# Patient Record
Sex: FEMALE | Race: WHITE | URBAN - METROPOLITAN AREA
[De-identification: names, ages, dates, MRNs, and addresses within clinical notes are randomized per-mention and may not be internally consistent; named-entity substitution may affect disease eponyms.]

---

## 2017-01-25 ENCOUNTER — PRE VISIT (OUTPATIENT)
Dept: OTOLARYNGOLOGY | Facility: CLINIC | Age: 82
End: 2017-01-25

## 2017-01-25 NOTE — TELEPHONE ENCOUNTER
1.  Date/reason for appt:  2/03/17   Vocal Cord Dysfunction    2.  Referring provider:  [facility]    3.  Call to patient (Yes / No - short description):  No, referred.      4.  Previous care at / records requested from:  [facility]

## 2017-02-09 ENCOUNTER — OFFICE VISIT (OUTPATIENT)
Dept: OTOLARYNGOLOGY | Facility: CLINIC | Age: 82
End: 2017-02-09

## 2017-02-09 DIAGNOSIS — R05.9 COUGH: Primary | ICD-10-CM

## 2017-02-09 DIAGNOSIS — J38.7 IRRITABLE LARYNX SYNDROME: ICD-10-CM

## 2017-02-09 DIAGNOSIS — J38.3 VOCAL CORD DYSFUNCTION: ICD-10-CM

## 2017-02-09 NOTE — LETTER
2/9/2017      RE: Dorina Vasquez  [address] [address]       [facility] VOICE [facility]  Brice Beck Jr., M.D., F.A.C.S.  Deana Stanley M.D., M.P.H.  Shiela Vicente, Ph.D., CCC/SLP  Elizabeth Martinez M.M. (voice), M.A., CCC/SLP  John Souza M.M. (voice), M.A., CCC/SLP    [facility] VOICE [facility]  VOICE/SPEECH/BREATHING EVALUATION AND LARYNGEAL EXAMINATION REPORT    Patient: Dorina Vasquez  Date of Visit: 2/9/2017    HISTORY  PATIENT INFORMATION  Dorina Vasquez was seen for initial evaluation today.  She is self-referred to this clinic, based on internet query.  She has been seen by ENT physicians Sam Carey and Michael Carranza for these problems.    DIAGNOSIS/REASON FOR REFERRAL  Cough and Vocal cord dysfunction/ Evaluate, perform laryngeal exam, treat as appropriate    HISTORY OF VOICE DISORDER  Ms. Vasquez is a 82 year old with a history of cough leading to vocal cord dysfunction.  Salient details of her history are as follows:    25 year Hx of cough leading to laryngospasms (aka vocal cord dysfunction); no precipitating event  o Many triggers  o Feels a tickle which leads to a cough  o When she feels the tickle she takes a sugar-free cough drop, and works hard to avoid coughing  o She can stop the cough with forceful exhalations, which can then circumvent the laryngospasm the majority of times  - The forceful exhalation was taught to her by Dr. Romeor, her PCP  o laryngospasms result in stridor less than 20% of the time in the last year; prior to that it was more often  o Episodes are fairly unpredictable; many possible triggers  o A few remote nocturnal episodes of cough without laryngospasm; these are essentially resolved    Increasing humidity in house has helped    No episodes for several weeks in January; then daily episodes after an exposure to smoke a week ago    Once triggered, the laryngospasm lasts a couple minutes, and then resolves over a few minutes, if she can avoid  coughing    She had an episode right after a surgery    Has seen two ENT physicians over past few years because of this problem  o The first recommended Zantac; she has been taking that b.i.d for almost a year; she thinks it may be helping (she had more relief when she doubled her dose from once to twice a day)  o Both noted normal vocal fold mobility, one by flexible endoscopy and one by mirror exam  o An incidental endoscopy during work-up for facial pain also showed normal mobility in 2013    No referral for therapy from ENT physicians; learned of the possibility of Vocal Cord Dysfunction while querying online; communicated with Dr. Bob Keenan, SLP voice specialist at [facility], who recommended this clinic    Lifelong history weak voice; has to strain to raise volume  o Unsure if voice use is related to cough or laryngospasm    Needs to undergo surgery for cataracts, but surgeon prefers in-patient procedure due to potential for laryngospasm    Patient Supplied Answers To [employer] Mail General Questionnaire  [employer] Intake - General Form Review 2/8/2017   Are you having any of these symptoms? Throat irritation, Throat tightness, Frequent throat-clearing, Frequent cough   Do you use caffeine? No   How many oz. of non-caffeinated fluid do you typically drink in a day? 32 oz.   How often do you experience heartburn, indigestion, chest pain, stomach acid coming up, and/or tasting acid in your mouth or throat? Monthly   Have reflux medications been recommended to you? Yes   If yes, are you taking them regularly? Yes   Voice: No   Swallowing: No   Cough and/or throat-clearing: Yes   Breathing problem: No   A different problem, not listed above: Yes   Other physicians/health care providers who should receive a copy of today's note (please provide first and last name(s), city): Dr. Jules Romero, my Internist, [facility], [phone]       Patient Supplied Answers To Shopping Mail Throat  Discomfort Questionnaire  No flowsheet data found.    Patient Supplied Answers To [facility] Intake Cough/Throat-Clearing Questionnaire  [facility] Intake - Cough/Throat-Clearing Review 2/8/2017   How long have you had this cough/throat-clearing problem? Cough and larynx spasm for 25+yrs.   Tickle in throat and cough can lead to larynx spasm. I quickly take sugar free cough drop, begin to quickly exhale w/ pursed lips in exaggerated form.  Hard to breathe & talk.  Stopping cough is crucial.   Was there anything unusual about the time this cough/throat-clearing problem was first noticed (such as illness, accident, surgery, etc)? If yes, please describe.  You have 200 characters to respond.  We will ask for more detail at your visit. To continue with answer in previous question, sometimes a cough drop is enough to stop cough, but if not, exaggerated exhaling is essential as well as stopping cough. In the 1990's spasms seemed to be less frequent.   What do you think caused this cough/throat-clearing problem? Spasms seemed reduced after new higher humidity device installed in our house.  Super Ball Sunday at my son's home, the fireplace warmed us all...but that night spasms returned. Noticed last few months, wine stings throat so I dilute w/ water.    How quickly did the cough/throat-clearing problem develop? Gradually   How do the cough/throat-clearing symptoms vary? Worse in the PM, Worse with stress, Worse with exertion, Variable, Unpredictable, On and off   Over time, how has the cough/throat-clearing problem changed? Same   Is there a tickle in your throat prior to coughing or throat clearing? Yes   What % of the time do you feel like you can control the urge to cough? 50%   Do any of the following trigger your cough? Cold air, Laughing, Drinking, Perfumes or strong smells, Trying to suppress a cough, Stress   If you have other triggers for your cough or throat-clearing issue, please list them here.  fireplace smoke, cold  milk mar at lunch causes esophageal discomfort,  dry air   What prior treatments have been tried for this cough/throat-clearing issue? Cough suppressants   What health care providers have you seen for this cough/throat-clearing problem? Who and when? Dr. Romero, my Internist., Dr. Sam Carey, ENT 2014, 2015 (he now is on medical leave) , Dr. Carranza ENT 2016   For your cough/throat-clearing problem, what testing/studies have you done (such as imaging, reflux testing, lung function studies, allergy testing)? Nasopharyngeal endoscopy; everything was normal.  Dr. Carey:  May be some acid reflux spraying on larynx so he prescribed Zantac.  I now take 2x day, 1/2 - 1 hour before lunch and 2 hours after dinner.   Did you receive any therapy or treatment for this cough/throat-clearing problem?  If so, please briefly describe. No.    For your cough/throat-clearing problem, is there anything else you'd like to tell us?  I am referred to Dr. Vicente by Dr. Bob Keenan, Ph.D. at [facility] after reading about his treatment for larynx spasms.  His writings show breathing exercises help reduce or eliminate larynx spasms.,        Patient Supplied Answers To [facility] Intake Breathing Questionnaire  No flowsheet data found.    Patient Supplied Answers To [facility] Intake Voice Questionnaire  No flowsheet data found.    Patient Supplied Answers To VHI Questionnaire  Voice Handicap Index (VHI-10) 2/8/2017   How often do you have any of the following symptoms:  Indigestion, heartburn, chest pain, stomach acid coming up, and/or tasting acid in your mouth or throat? Monthly   (F1) My voice makes it difficult for people to hear me. Sometimes   (F2) People have difficulty understanding me in a noisy room. Sometimes   (F8) My voice difficulties restrict my personal and social life. Never   (F9) I feel left out of conversations because of my voice. Never   (F10) My voice problem causes me to lose income. Never   (P5) I feel as though I  "have to strain to produce voice. Sometimes   (P6) The clarity of my voice is unpredictable. Never   (E4) My voice problem upsets me. Almost never   (E6) My voice makes me feel handicapped. Never   (P3) People ask, \"What's wrong with your voice?\" Never   VHI Total Score 7       Patient Supplied Answers To CSI Questionnaire  No flowsheet data found.    Patient Supplied Answers To EAT Questionnaire  No flowsheet data found.    CURRENT PATIENT COMPLAINTS    Primary: cough    Secondary: laryngospasm    soft voice quality; needs to push or strain voice to      Throat clearing and irritation    Denies significant dyspnea, dysphagia, or pain    OTHER PERTINENT HISTORY    Please see scanned dictations in EPIC    Unremarkable to this problem    OBJECTIVE FINDINGS  VOICE/ SPEECH/ NON-COMMUNICATIVE LARYNGEAL BEHAVIORS EVALUATION  An evaluation of the voice and breathing was accomplished today; salient features are as follows:    Cough/ Throat clear:    Not observed today    Breathing pattern:    Appears within normal limits and adequate during conversational speech at rest    No dyspnea or laryngospasms observed today    No overt tension is evident.    Voice quality is characterized by    Mild roughness and strain; WNL for her age    Frequent glottal maldonado    Habitual pitch was not formally tested, but is judged to be WNL and appropriate    Loudness is WNL and appropriate for the setting    LARYNGEAL EXAMINATION    Endoscopic laryngeal examination was not deemed warranted today, as she has had laryngeal exams demonstrating normal vocal fold mobility.  She preferred to learn techniques to avoid or arrest an episode of vocal fold dysfunction.  If she is not able to use these techniques adequately, we will do an examination while eliciting symptoms, so she can view compensatory strategies on the video monitor.  However, I did show her a number of laryngeal examination videos from our library of educational materials, so that she can " understand the nature of vocal cord dysfunction, and how it can be remediated functionally.  She found this to be very helpful.      THERAPEUTIC ACTIVITIES  Today Ms. Vasquez participated in the following therapeutic activities:    [place] concepts and technqiues for using saline and plain-water gargling and saline nasal irrigation, steam, and guaifenesin to reduce the laryngeal irritation.    [place] concepts and techniques for improving topical and systemic hydration to improve health of the laryngeal mucosa    [place] concepts and strategies managing laryngopharyngeal reflux disorder, to reduce laryngeal inflammation.    I provided instruction for techniques and strategies to reduce the chronic cough/throat clearing  o alternative behaviors such as voiceless glottic coup, humming, swallowing, etc. were taught  o strategies for reducing mucosal irritation were taught    [place] techniques for optimal breathing technique.    [place] techniques for rescue breathing in case of laryngospasm, aka vocal cord dysfunction  o [place] to use inhalation through a straw, or through rounded lips or the nasal passages, to increase the constriction at the upstream portion of inhalation, in order to reduce constriction downstream (at the glottis)  o [place] to use an abdominal muscle use pattern, in order to reduce the propensity for vocal cord dysfunction caused by clavicular inhalation patterns  o Good learning during practice of breathing techniques; understands how to practice on her own    [place] an optimal practice regimen for rehabilitation exercises.  o she should use an interval schedule of practice, with brief periods of practice frequently throughout each day  o [place] concepts of volitional practice to facilitate motor learning.    IMPRESSIONS/ RECOMMENDATIONS/ PLAN  IMPRESSIONS / RECOMMENDATIONS  Based on today's evaluation and initial therapy,  it appears that:    Cough/throat clear are accounted for by  hypersensitivity of the larynx and pharynx as evidenced by case history, patient complaints and apparent absence of other organic findings; this leads to a diagnosis of irritable larynx syndrome that underlies her other disorders    Vocal cord dysfunction (aka laryngospasm) is an apparent manifestation of the cough and irritable larynx, and should be remediated along with the more global symptoms; rescue breathing techniques were taught today    A course of speech therapy is recommended to:  o help reduce laryngeal irritation and thereby reduce cough and throat clear  o Teach breathing techniques to prevent and/or arrest an episode of vocal fold dysfunction  o Reduce overall irritable larynx syndrome    she is entirely amenable to this plan    We began therapy today, working on strategies to improve vocal health and technique    RATIONALE: Current level of functioning is:  CJ - At least 20 percent but less than 40 percent impaired, limited, or restricted   based on Ms. Vasquez's extent of vocal cord dysfunction, extent of impact on function, extent of discomfort, level of effort.    TREATMENT PLAN  Speech therapy    DURATION/FREQUENCY OF TREATMENT  Six weekly, one-hour sessions, with two monthly one-hour follow-up sessions    PROGNOSIS  good prognosis for improvement in cough, vocal cord dysfunction, and irritable larynx syndrome, with speech therapy and regular practice of therapeutic activities.    BARRIERS TO LEARNING/TEACHING AND LEARNING NEEDS  None/Unremarkable    GOALS  Patient goal:    To reduce her cough to acceptable levels  To breathe normally and comfortably in all situations    Long-term goal(s): In 3 months, Ms. Vasquez will:  1.  Report a week of typcal activities with irritable larynx syndrome symptoms that do not exceed a level of 3 out of 10, 80% of the time  2.  Report a week with no more than one episode of coughing per day, that does not last more than 2 seconds  3.  Report a week with no episodes  of vocal cord dysfuncton (aka laryngospasm)    G-Codes   - Voice functional limitation, current status, at evaluation  CJ - At least 20 percent but less than 40 percent impaired, limited, or restricted     - Voice functional limitation, projected goal status, at discharge from therapy  CI - At least 1 percent but less than 20 percent impaired, limited, or restricted    PRIMARY ICD-10 code:  R05 (Chronic Cough)  SECONDARY ICD-10 code:  J38.3 (Vocal cord dysfunction)   TERTIARY ICD-10 code:  J38.7 (Irritable Larynx Syndrome)    TOTAL SERVICE TIME: 120 minutes  EVALUATION OF VOICE AND RESONANCE: (40017): 75 minutes    TREATMENT (65402): 45 minutes  NO CHARGE FACILITY FEE (30192)      Shiela Vicente, Ph.D., [facility]SLP  Speech-Language Pathologist  Director, [facility]  [phone]              Shiela Vicente, SLP

## 2017-02-09 NOTE — LETTER
2/9/2017       RE: Dorina Vasquez  [address] [address]     Dear Colleague,    Thank you for referring your patient, Dorina Vasquez, to the [facility] at [facility]. Please see a copy of my visit note below.    [facility] VOICE [facility]  Brice Beck Jr., M.D., F.A.C.S.  Deana Stanley M.D., M.P.H.  Shiela Vicente, Ph.D., CCC/SLP  Elizabeth Martinez M.M. (voice), MANIA., CCC/SLP  John Souza M.M. (voice), MANIA., [facility]/SLP    [facility]  VOICE/SPEECH/BREATHING EVALUATION AND LARYNGEAL EXAMINATION REPORT    Patient: Dorina Vasquez  Date of Visit: 2/9/2017    HISTORY  PATIENT INFORMATION  Dorina Vasquez was seen for initial evaluation today.  She is self-referred to this clinic, based on internet query.  She has been seen by ENT physicians Sam Carey and Michael Carranza for these problems.    DIAGNOSIS/REASON FOR REFERRAL  Cough and Vocal cord dysfunction/ Evaluate, perform laryngeal exam, treat as appropriate    HISTORY OF VOICE DISORDER  Ms. Vasquez is a 82 year old with a history of cough leading to vocal cord dysfunction.  Salient details of her history are as follows:    25 year Hx of cough leading to laryngospasms (aka vocal cord dysfunction); no precipitating event  o Many triggers  o Feels a tickle which leads to a cough  o When she feels the tickle she takes a sugar-free cough drop, and works hard to avoid coughing  o She can stop the cough with forceful exhalations, which can then circumvent the laryngospasm the majority of times  - The forceful exhalation was taught to her by Dr. Romero, her PCP  o laryngospasms result in stridor less than 20% of the time in the last year; prior to that it was more often  o Episodes are fairly unpredictable; many possible triggers  o A few remote nocturnal episodes of cough without laryngospasm; these are essentially resolved    Increasing humidity in house has helped    No episodes for several weeks in  January; then daily episodes after an exposure to smoke a week ago    Once triggered, the laryngospasm lasts a couple minutes, and then resolves over a few minutes, if she can avoid coughing    She had an episode right after a surgery    Has seen two ENT physicians over past few years because of this problem  o The first recommended Zantac; she has been taking that b.i.d for almost a year; she thinks it may be helping (she had more relief when she doubled her dose from once to twice a day)  o Both noted normal vocal fold mobility, one by flexible endoscopy and one by mirror exam  o An incidental endoscopy during work-up for facial pain also showed normal mobility in 2013    No referral for therapy from ENT physicians; learned of the possibility of Vocal Cord Dysfunction while querying online; communicated with Dr. Bob Keenan, SLP voice specialist at [facility], who recommended this clinic    Lifelong history weak voice; has to strain to raise volume  o Unsure if voice use is related to cough or laryngospasm    Needs to undergo surgery for cataracts, but surgeon prefers in-patient procedure due to potential for laryngospasm    Patient Supplied Answers To [employer] Intake General Questionnaire  [employer] Intake - General Form Review 2/8/2017   Are you having any of these symptoms? Throat irritation, Throat tightness, Frequent throat-clearing, Frequent cough   Do you use caffeine? No   How many oz. of non-caffeinated fluid do you typically drink in a day? 32 oz.   How often do you experience heartburn, indigestion, chest pain, stomach acid coming up, and/or tasting acid in your mouth or throat? Monthly   Have reflux medications been recommended to you? Yes   If yes, are you taking them regularly? Yes   Voice: No   Swallowing: No   Cough and/or throat-clearing: Yes   Breathing problem: No   A different problem, not listed above: Yes   Other physicians/health care providers who should receive a copy of today's note  (please provide first and last name(s), city): Dr. Jules Romero, my Internist, [facility], [phone]       Patient Supplied Answers To [employer] Intake Throat Discomfort Questionnaire  No flowsheet data found.    Patient Supplied Answers To [employer] Intake Cough/Throat-Clearing Questionnaire  [employer]ions Intake - Cough/Throat-Clearing Review 2/8/2017   How long have you had this cough/throat-clearing problem? Cough and larynx spasm for 25+yrs.   Tickle in throat and cough can lead to larynx spasm. I quickly take sugar free cough drop, begin to quickly exhale w/ pursed lips in exaggerated form.  Hard to breathe & talk.  Stopping cough is crucial.   Was there anything unusual about the time this cough/throat-clearing problem was first noticed (such as illness, accident, surgery, etc)? If yes, please describe.  You have 200 characters to respond.  We will ask for more detail at your visit. To continue with answer in previous question, sometimes a cough drop is enough to stop cough, but if not, exaggerated exhaling is essential as well as stopping cough. In the 1990's spasms seemed to be less frequent.   What do you think caused this cough/throat-clearing problem? Spasms seemed reduced after new higher humidity device installed in our house.  Super Ball Sunday at my son's home, the fireplace warmed us all...but that night spasms returned. Noticed last few months, wine stings throat so I dilute w/ water.    How quickly did the cough/throat-clearing problem develop? Gradually   How do the cough/throat-clearing symptoms vary? Worse in the PM, Worse with stress, Worse with exertion, Variable, Unpredictable, On and off   Over time, how has the cough/throat-clearing problem changed? Same   Is there a tickle in your throat prior to coughing or throat clearing? Yes   What % of the time do you feel like you can control the urge to cough? 50%   Do any of the following trigger your cough? Cold air, Laughing, Drinking,  Perfumes or strong smells, Trying to suppress a cough, Stress   If you have other triggers for your cough or throat-clearing issue, please list them here.  fireplace smoke, cold milk mar at lunch causes esophageal discomfort,  dry air   What prior treatments have been tried for this cough/throat-clearing issue? Cough suppressants   What health care providers have you seen for this cough/throat-clearing problem? Who and when? Dr. Romero, my Internist., Dr. Sam Carey, ENT 2014, 2015 (he now is on medical leave) , Dr. Carranza ENT 2016   For your cough/throat-clearing problem, what testing/studies have you done (such as imaging, reflux testing, lung function studies, allergy testing)? Nasopharyngeal endoscopy; everything was normal.  Dr. Carey:  May be some acid reflux spraying on larynx so he prescribed Zantac.  I now take 2x day, 1/2 - 1 hour before lunch and 2 hours after dinner.   Did you receive any therapy or treatment for this cough/throat-clearing problem?  If so, please briefly describe. No.    For your cough/throat-clearing problem, is there anything else you'd like to tell us?  I am referred to Dr. Vicente by Dr. Bob Keenan, Ph.D. at [facility] after reading about his treatment for larynx spasms.  His writings show breathing exercises help reduce or eliminate larynx spasms.,        Patient Supplied Answers To [employer] Intake Breathing Questionnaire  No flowsheet data found.    Patient Supplied Answers To [employer] Intake Voice Questionnaire  No flowsheet data found.    Patient Supplied Answers To VHI Questionnaire  Voice Handicap Index (VHI-10) 2/8/2017   How often do you have any of the following symptoms:  Indigestion, heartburn, chest pain, stomach acid coming up, and/or tasting acid in your mouth or throat? Monthly   (F1) My voice makes it difficult for people to hear me. Sometimes   (F2) People have difficulty understanding me in a noisy room. Sometimes   (F8) My voice difficulties restrict my  "personal and social life. Never   (F9) I feel left out of conversations because of my voice. Never   (F10) My voice problem causes me to lose income. Never   (P5) I feel as though I have to strain to produce voice. Sometimes   (P6) The clarity of my voice is unpredictable. Never   (E4) My voice problem upsets me. Almost never   (E6) My voice makes me feel handicapped. Never   (P3) People ask, \"What's wrong with your voice?\" Never   VHI Total Score 7       Patient Supplied Answers To CSI Questionnaire  No flowsheet data found.    Patient Supplied Answers To EAT Questionnaire  No flowsheet data found.    CURRENT PATIENT COMPLAINTS    Primary: cough    Secondary: laryngospasm    soft voice quality; needs to push or strain voice to      Throat clearing and irritation    Denies significant dyspnea, dysphagia, or pain    OTHER PERTINENT HISTORY    Please see scanned dictations in EPIC    Unremarkable to this problem    OBJECTIVE FINDINGS  VOICE/ SPEECH/ NON-COMMUNICATIVE LARYNGEAL BEHAVIORS EVALUATION  An evaluation of the voice and breathing was accomplished today; salient features are as follows:    Cough/ Throat clear:    Not observed today    Breathing pattern:    Appears within normal limits and adequate during conversational speech at rest    No dyspnea or laryngospasms observed today    No overt tension is evident.    Voice quality is characterized by    Mild roughness and strain; WNL for her age    Frequent glottal maldonado    Habitual pitch was not formally tested, but is judged to be WNL and appropriate    Loudness is WNL and appropriate for the setting    LARYNGEAL EXAMINATION    Endoscopic laryngeal examination was not deemed warranted today, as she has had laryngeal exams demonstrating normal vocal fold mobility.  She preferred to learn techniques to avoid or arrest an episode of vocal fold dysfunction.  If she is not able to use these techniques adequately, we will do an examination while eliciting symptoms, so " she can view compensatory strategies on the video monitor.  However, I did show her a number of laryngeal examination videos from our library of educational materials, so that she can understand the nature of vocal cord dysfunction, and how it can be remediated functionally.  She found this to be very helpful.      THERAPEUTIC ACTIVITIES  Today Ms. Vasquez participated in the following therapeutic activities:    [place] concepts and technqiues for using saline and plain-water gargling and saline nasal irrigation, steam, and guaifenesin to reduce the laryngeal irritation.    [place] concepts and techniques for improving topical and systemic hydration to improve health of the laryngeal mucosa    [place] concepts and strategies managing laryngopharyngeal reflux disorder, to reduce laryngeal inflammation.    I provided instruction for techniques and strategies to reduce the chronic cough/throat clearing  o alternative behaviors such as voiceless glottic coup, humming, swallowing, etc. were taught  o strategies for reducing mucosal irritation were taught    [place] techniques for optimal breathing technique.    [place] techniques for rescue breathing in case of laryngospasm, aka vocal cord dysfunction  o [place] to use inhalation through a straw, or through rounded lips or the nasal passages, to increase the constriction at the upstream portion of inhalation, in order to reduce constriction downstream (at the glottis)  o [place] to use an abdominal muscle use pattern, in order to reduce the propensity for vocal cord dysfunction caused by clavicular inhalation patterns  o Good learning during practice of breathing techniques; understands how to practice on her own    [place] an optimal practice regimen for rehabilitation exercises.  o she should use an interval schedule of practice, with brief periods of practice frequently throughout each day  o [place] concepts of volitional practice to facilitate motor  learning.    IMPRESSIONS/ RECOMMENDATIONS/ PLAN  IMPRESSIONS / RECOMMENDATIONS  Based on today's evaluation and initial therapy,  it appears that:    Cough/throat clear are accounted for by hypersensitivity of the larynx and pharynx as evidenced by case history, patient complaints and apparent absence of other organic findings; this leads to a diagnosis of irritable larynx syndrome that underlies her other disorders    Vocal cord dysfunction (aka laryngospasm) is an apparent manifestation of the cough and irritable larynx, and should be remediated along with the more global symptoms; rescue breathing techniques were taught today    A course of speech therapy is recommended to:  o help reduce laryngeal irritation and thereby reduce cough and throat clear  o Teach breathing techniques to prevent and/or arrest an episode of vocal fold dysfunction  o Reduce overall irritable larynx syndrome    she is entirely amenable to this plan    We began therapy today, working on strategies to improve vocal health and technique    RATIONALE: Current level of functioning is:  CJ - At least 20 percent but less than 40 percent impaired, limited, or restricted   based on Ms. Vasquez's extent of vocal cord dysfunction, extent of impact on function, extent of discomfort, level of effort.    TREATMENT PLAN  Speech therapy    DURATION/FREQUENCY OF TREATMENT  Six weekly, one-hour sessions, with two monthly one-hour follow-up sessions    PROGNOSIS  good prognosis for improvement in cough, vocal cord dysfunction, and irritable larynx syndrome, with speech therapy and regular practice of therapeutic activities.    BARRIERS TO LEARNING/TEACHING AND LEARNING NEEDS  None/Unremarkable    GOALS  Patient goal:    To reduce her cough to acceptable levels  To breathe normally and comfortably in all situations    Long-term goal(s): In 3 months, Ms. Vasquez will:  1.  Report a week of typcal activities with irritable larynx syndrome symptoms that do not  exceed a level of 3 out of 10, 80% of the time  2.  Report a week with no more than one episode of coughing per day, that does not last more than 2 seconds  3.  Report a week with no episodes of vocal cord dysfuncton (aka laryngospasm)    G-Codes   - Voice functional limitation, current status, at evaluation  CJ - At least 20 percent but less than 40 percent impaired, limited, or restricted     - Voice functional limitation, projected goal status, at discharge from therapy  CI - At least 1 percent but less than 20 percent impaired, limited, or restricted    PRIMARY ICD-10 code:  R05 (Chronic Cough)  SECONDARY ICD-10 code:  J38.3 (Vocal cord dysfunction)   TERTIARY ICD-10 code:  J38.7 (Irritable Larynx Syndrome)    TOTAL SERVICE TIME: 120 minutes  EVALUATION OF VOICE AND RESONANCE: (11608): 75 minutes    TREATMENT (22610): 45 minutes  NO CHARGE FACILITY FEE (73212)      Shiela Vicente, Ph.D., [facility]-SLP  Speech-Language Pathologist  Director, [facility]  [phone]              Again, thank you for allowing me to participate in the care of your patient.      Sincerely,    Shiela Vicente, SLP

## 2017-02-09 NOTE — PROGRESS NOTES
[facility] VOICE [facility]  Brice Beck Jr., M.D., F.A.C.S.  Deana Stanley M.D., M.P.H.  Shiela Vicente, Ph.D., CCC/SLP  Elizabeth Martinez M.M. (voice), M.A., CCC/SLP  John Souza M.M. (voice), M.A., [facility]/SLP    [facility] VOICE [facility]  VOICE/SPEECH/BREATHING EVALUATION AND LARYNGEAL EXAMINATION REPORT    Patient: Dorina Vasquez  Date of Visit: 2/9/2017    HISTORY  PATIENT INFORMATION  Dorina Vasquez was seen for initial evaluation today.  She is self-referred to this clinic, based on internet query.  She has been seen by ENT physicians Sam Carey and Michael Carranza for these problems.    DIAGNOSIS/REASON FOR REFERRAL  Cough and Vocal cord dysfunction/ Evaluate, perform laryngeal exam, treat as appropriate    HISTORY OF VOICE DISORDER  Ms. Vasquez is a 82 year old with a history of cough leading to vocal cord dysfunction.  Salient details of her history are as follows:    25 year Hx of cough leading to laryngospasms (aka vocal cord dysfunction); no precipitating event  o Many triggers  o Feels a tickle which leads to a cough  o When she feels the tickle she takes a sugar-free cough drop, and works hard to avoid coughing  o She can stop the cough with forceful exhalations, which can then circumvent the laryngospasm the majority of times  - The forceful exhalation was taught to her by Dr. Romero, her PCP  o laryngospasms result in stridor less than 20% of the time in the last year; prior to that it was more often  o Episodes are fairly unpredictable; many possible triggers  o A few remote nocturnal episodes of cough without laryngospasm; these are essentially resolved    Increasing humidity in house has helped    No episodes for several weeks in January; then daily episodes after an exposure to smoke a week ago    Once triggered, the laryngospasm lasts a couple minutes, and then resolves over a few minutes, if she can avoid coughing    She had an episode right after a surgery    Has seen two ENT physicians over past few  years because of this problem  o The first recommended Zantac; she has been taking that b.i.d for almost a year; she thinks it may be helping (she had more relief when she doubled her dose from once to twice a day)  o Both noted normal vocal fold mobility, one by flexible endoscopy and one by mirror exam  o An incidental endoscopy during work-up for facial pain also showed normal mobility in 2013    No referral for therapy from ENT physicians; learned of the possibility of Vocal Cord Dysfunction while querying online; communicated with Dr. Bob Keenan, SLP voice specialist at [facility], who recommended this clinic    Lifelong history weak voice; has to strain to raise volume  o Unsure if voice use is related to cough or laryngospasm    Needs to undergo surgery for cataracts, but surgeon prefers in-patient procedure due to potential for laryngospasm    Patient Supplied Answers To [employer] Intake General Questionnaire  [facility]ions Intake - General Form Review 2/8/2017   Are you having any of these symptoms? Throat irritation, Throat tightness, Frequent throat-clearing, Frequent cough   Do you use caffeine? No   How many oz. of non-caffeinated fluid do you typically drink in a day? 32 oz.   How often do you experience heartburn, indigestion, chest pain, stomach acid coming up, and/or tasting acid in your mouth or throat? Monthly   Have reflux medications been recommended to you? Yes   If yes, are you taking them regularly? Yes   Voice: No   Swallowing: No   Cough and/or throat-clearing: Yes   Breathing problem: No   A different problem, not listed above: Yes   Other physicians/health care providers who should receive a copy of today's note (please provide first and last name(s), city): Dr. Jules Romero, my Internist, [facility], [phone]       Patient Supplied Answers To [employer]ie[employer] Intake Throat Discomfort Questionnaire  No flowsheet data found.    Patient Supplied Answers To Lions Intake  Cough/Throat-Clearing Questionnaire  Lieliane Intake - Cough/Throat-Clearing Review 2/8/2017   How long have you had this cough/throat-clearing problem? Cough and larynx spasm for 25+yrs.   Tickle in throat and cough can lead to larynx spasm. I quickly take sugar free cough drop, begin to quickly exhale w/ pursed lips in exaggerated form.  Hard to breathe & talk.  Stopping cough is crucial.   Was there anything unusual about the time this cough/throat-clearing problem was first noticed (such as illness, accident, surgery, etc)? If yes, please describe.  You have 200 characters to respond.  We will ask for more detail at your visit. To continue with answer in previous question, sometimes a cough drop is enough to stop cough, but if not, exaggerated exhaling is essential as well as stopping cough. In the 1990's spasms seemed to be less frequent.   What do you think caused this cough/throat-clearing problem? Spasms seemed reduced after new higher humidity device installed in our house.  Super Ball Sunday at my son's home, the fireplace warmed us all...but that night spasms returned. Noticed last few months, wine stings throat so I dilute w/ water.    How quickly did the cough/throat-clearing problem develop? Gradually   How do the cough/throat-clearing symptoms vary? Worse in the PM, Worse with stress, Worse with exertion, Variable, Unpredictable, On and off   Over time, how has the cough/throat-clearing problem changed? Same   Is there a tickle in your throat prior to coughing or throat clearing? Yes   What % of the time do you feel like you can control the urge to cough? 50%   Do any of the following trigger your cough? Cold air, Laughing, Drinking, Perfumes or strong smells, Trying to suppress a cough, Stress   If you have other triggers for your cough or throat-clearing issue, please list them here.  fireplace smoke, cold milk mar at lunch causes esophageal discomfort,  dry air   What prior treatments have been tried  for this cough/throat-clearing issue? Cough suppressants   What health care providers have you seen for this cough/throat-clearing problem? Who and when? Dr. Romero, my Internist., Dr. Sam Carey, ENT 2014, 2015 (he now is on medical leave) , Dr. Carranza ENT 2016   For your cough/throat-clearing problem, what testing/studies have you done (such as imaging, reflux testing, lung function studies, allergy testing)? Nasopharyngeal endoscopy; everything was normal.  Dr. Carey:  May be some acid reflux spraying on larynx so he prescribed Zantac.  I now take 2x day, 1/2 - 1 hour before lunch and 2 hours after dinner.   Did you receive any therapy or treatment for this cough/throat-clearing problem?  If so, please briefly describe. No.    For your cough/throat-clearing problem, is there anything else you'd like to tell us?  I am referred to Dr. Vicente by Dr. Bob Keenan, Ph.D. at [facility] after reading about his treatment for larynx spasms.  His writings show breathing exercises help reduce or eliminate larynx spasms.,        Patient Supplied Answers To Lions Intake Breathing Questionnaire  No flowsheet data found.    Patient Supplied Answers To Lions Intake Voice Questionnaire  No flowsheet data found.    Patient Supplied Answers To VHI Questionnaire  Voice Handicap Index (VHI-10) 2/8/2017   How often do you have any of the following symptoms:  Indigestion, heartburn, chest pain, stomach acid coming up, and/or tasting acid in your mouth or throat? Monthly   (F1) My voice makes it difficult for people to hear me. Sometimes   (F2) People have difficulty understanding me in a noisy room. Sometimes   (F8) My voice difficulties restrict my personal and social life. Never   (F9) I feel left out of conversations because of my voice. Never   (F10) My voice problem causes me to lose income. Never   (P5) I feel as though I have to strain to produce voice. Sometimes   (P6) The clarity of my voice is unpredictable. Never  "  (E4) My voice problem upsets me. Almost never   (E6) My voice makes me feel handicapped. Never   (P3) People ask, \"What's wrong with your voice?\" Never   VHI Total Score 7       Patient Supplied Answers To CSI Questionnaire  No flowsheet data found.    Patient Supplied Answers To EAT Questionnaire  No flowsheet data found.    CURRENT PATIENT COMPLAINTS    Primary: cough    Secondary: laryngospasm    soft voice quality; needs to push or strain voice to      Throat clearing and irritation    Denies significant dyspnea, dysphagia, or pain    OTHER PERTINENT HISTORY    Please see scanned dictations in EPIC    Unremarkable to this problem    OBJECTIVE FINDINGS  VOICE/ SPEECH/ NON-COMMUNICATIVE LARYNGEAL BEHAVIORS EVALUATION  An evaluation of the voice and breathing was accomplished today; salient features are as follows:    Cough/ Throat clear:    Not observed today    Breathing pattern:    Appears within normal limits and adequate during conversational speech at rest    No dyspnea or laryngospasms observed today    No overt tension is evident.    Voice quality is characterized by    Mild roughness and strain; WNL for her age    Frequent glottal maldonado    Habitual pitch was not formally tested, but is judged to be WNL and appropriate    Loudness is WNL and appropriate for the setting    LARYNGEAL EXAMINATION    Endoscopic laryngeal examination was not deemed warranted today, as she has had laryngeal exams demonstrating normal vocal fold mobility.  She preferred to learn techniques to avoid or arrest an episode of vocal fold dysfunction.  If she is not able to use these techniques adequately, we will do an examination while eliciting symptoms, so she can view compensatory strategies on the video monitor.  However, I did show her a number of laryngeal examination videos from our library of educational materials, so that she can understand the nature of vocal cord dysfunction, and how it can be remediated functionally.  She " found this to be very helpful.      THERAPEUTIC ACTIVITIES  Today Ms. Vasquez participated in the following therapeutic activities:    Grant concepts and technqiues for using saline and plain-water gargling and saline nasal irrigation, steam, and guaifenesin to reduce the laryngeal irritation.    Grant concepts and techniques for improving topical and systemic hydration to improve health of the laryngeal mucosa    Grant concepts and strategies managing laryngopharyngeal reflux disorder, to reduce laryngeal inflammation.    I provided instruction for techniques and strategies to reduce the chronic cough/throat clearing  o alternative behaviors such as voiceless glottic coup, humming, swallowing, etc. were taught  o strategies for reducing mucosal irritation were taught    Grant techniques for optimal breathing technique.    Grant techniques for rescue breathing in case of laryngospasm, aka vocal cord dysfunction  o Grant to use inhalation through a straw, or through rounded lips or the nasal passages, to increase the constriction at the upstream portion of inhalation, in order to reduce constriction downstream (at the glottis)  o Grant to use an abdominal muscle use pattern, in order to reduce the propensity for vocal cord dysfunction caused by clavicular inhalation patterns  o Good learning during practice of breathing techniques; understands how to practice on her own    Grant an optimal practice regimen for rehabilitation exercises.  o she should use an interval schedule of practice, with brief periods of practice frequently throughout each day  o Grant concepts of volitional practice to facilitate motor learning.    IMPRESSIONS/ RECOMMENDATIONS/ PLAN  IMPRESSIONS / RECOMMENDATIONS  Based on today's evaluation and initial therapy,  it appears that:    Cough/throat clear are accounted for by hypersensitivity of the larynx and pharynx as evidenced by case history, patient complaints and apparent  absence of other organic findings; this leads to a diagnosis of irritable larynx syndrome that underlies her other disorders    Vocal cord dysfunction (aka laryngospasm) is an apparent manifestation of the cough and irritable larynx, and should be remediated along with the more global symptoms; rescue breathing techniques were taught today    A course of speech therapy is recommended to:  o help reduce laryngeal irritation and thereby reduce cough and throat clear  o Teach breathing techniques to prevent and/or arrest an episode of vocal fold dysfunction  o Reduce overall irritable larynx syndrome    she is entirely amenable to this plan    We began therapy today, working on strategies to improve vocal health and technique    RATIONALE: Current level of functioning is:  CJ - At least 20 percent but less than 40 percent impaired, limited, or restricted   based on Ms. Vasquez's extent of vocal cord dysfunction, extent of impact on function, extent of discomfort, level of effort.    TREATMENT PLAN  Speech therapy    DURATION/FREQUENCY OF TREATMENT  Six weekly, one-hour sessions, with two monthly one-hour follow-up sessions    PROGNOSIS  good prognosis for improvement in cough, vocal cord dysfunction, and irritable larynx syndrome, with speech therapy and regular practice of therapeutic activities.    BARRIERS TO LEARNING/TEACHING AND LEARNING NEEDS  None/Unremarkable    GOALS  Patient goal:    To reduce her cough to acceptable levels  To breathe normally and comfortably in all situations    Long-term goal(s): In 3 months, Ms. Vasquez will:  1.  Report a week of typcal activities with irritable larynx syndrome symptoms that do not exceed a level of 3 out of 10, 80% of the time  2.  Report a week with no more than one episode of coughing per day, that does not last more than 2 seconds  3.  Report a week with no episodes of vocal cord dysfuncton (aka laryngospasm)    G-Codes   - Voice functional limitation, current  status, at evaluation  CJ - At least 20 percent but less than 40 percent impaired, limited, or restricted     - Voice functional limitation, projected goal status, at discharge from therapy  CI - At least 1 percent but less than 20 percent impaired, limited, or restricted    PRIMARY ICD-10 code:  R05 (Chronic Cough)  SECONDARY ICD-10 code:  J38.3 (Vocal cord dysfunction)   TERTIARY ICD-10 code:  J38.7 (Irritable Larynx Syndrome)    TOTAL SERVICE TIME: 120 minutes  EVALUATION OF VOICE AND RESONANCE: (14515): 75 minutes    TREATMENT (64631): 45 minutes  NO CHARGE FACILITY FEE (67113)      Shiela Vicente, Ph.D., [facility]-SLP  Speech-Language Pathologist  Director, [facility]  [phone]

## 2017-02-09 NOTE — MR AVS SNAPSHOT
After Visit Summary   2/9/2017    Dorina Vasquez    MRN: [id number]           Patient Information     Date Of Birth          4/24/1934        Visit Information        Provider Department      2/9/2017 1:30 PM Shiela Vicente SLP [facility]        Today's Diagnoses     Cough    -  1    Vocal cord dysfunction        Irritable larynx syndrome           Follow-ups after your visit        Your next 10 appointments already scheduled     Mar 02, 2017  1:30 PM CST   (Arrive by 1:15 PM)   RETURN SLP VOICE with [facility] ([facility])    [address] [place] [id number] [phone]            Apr 04, 2017  2:30 PM CDT   (Arrive by 2:15 PM)   RETURN SLP VOICE with [facility] ([facility])    [address] [place] [id number] [phone]            Apr 25, 2017  2:30 PM CDT   (Arrive by 2:15 PM)   RETURN SLP VOICE with [facility] ([facility])    [address] [place] [id number] [phone]              Who to contact     Please call your clinic at [phone] to:    Ask questions about your health    Make or cancel appointments    Discuss your medicines    Learn about your test results    Speak to your doctor   If you have compliments or concerns about an experience at your clinic, or if you wish to file a complaint, please contact [facility] Patient Relations at [phone] or email us at [email or web address]         Additional Information About Your Visit        MyChart Information     [facility] gives you secure access to your electronic health record. If you see a primary care provider, you can also send messages to your care team and make appointments. If you have questions, please call your primary care clinic.  If you  do not have a primary care provider, please call [phone] and they will assist you.      [employer] is an electronic gateway that provides easy, online access to your medical records. With [employer], you can request a clinic appointment, read your test results, renew a prescription or communicate with your care team.     To access your existing account, please contact your [facility] or call [phone] for assistance.        Care EveryWhere ID     This is your Care EveryWhere ID. This could be used by other organizations to access your [place] medical records  [id number]         Blood Pressure from Last 3 Encounters:   No data found for BP    Weight from Last 3 Encounters:   No data found for Wt              We Performed the Following     C BEHAVIORAL & QUALITATIVE ANALYSIS VOICE AND RESONANCE     SPEECH/HEARING THERAPY, INDIVIDUAL        Primary Care Provider Office Phone # Fax #    Jules Romero [phone]       [facility] [address] [address]        Thank you!     Thank you for choosing  [facility]  for your care. Our goal is always to provide you with excellent care. Hearing back from our patients is one way we can continue to improve our services. Please take a few minutes to complete the written survey that you may receive in the mail after your visit with us. Thank you!             Your Updated Medication List - Protect others around you: Learn how to safely use, store and throw away your medicines at www.disposemymeds.org.      Notice  As of 2/9/2017 11:59 PM    You have not been prescribed any medications.

## 2017-02-09 NOTE — LETTER
2/9/2017       RE: Dorina Vasquez  [address] [facility] [address]     Dear Colleague,    Thank you for referring your patient, Dorina Vasquez, to the [facility] at [facility]. Please see a copy of my visit note below.    [facility] VOICE [facility]  Brice Beck Jr., M.D., F.A.C.S.  Deana Stanley M.D., M.P.H.  Shiela Vicente, Ph.D., CCC/SLP  Elizabeth Martinez M.M. (voice), MANIA., CCC/SLP  John Souza M.M. (voice), MANIA., [facility]/SLP    [facility]  VOICE/SPEECH/BREATHING EVALUATION AND LARYNGEAL EXAMINATION REPORT    Patient: Dorina Vasquez  Date of Visit: 2/9/2017    HISTORY  PATIENT INFORMATION  Dorina Vasquez was seen for initial evaluation today.  She is self-referred to this clinic, based on internet query.  She has been seen by ENT physicians Sam Carey and Michael Carranza for these problems.    DIAGNOSIS/REASON FOR REFERRAL  Cough and Vocal cord dysfunction/ Evaluate, perform laryngeal exam, treat as appropriate    HISTORY OF VOICE DISORDER  Ms. Vasquez is a 82 year old with a history of cough leading to vocal cord dysfunction.  Salient details of her history are as follows:    25 year Hx of cough leading to laryngospasms (aka vocal cord dysfunction); no precipitating event  o Many triggers  o Feels a tickle which leads to a cough  o When she feels the tickle she takes a sugar-free cough drop, and works hard to avoid coughing  o She can stop the cough with forceful exhalations, which can then circumvent the laryngospasm the majority of times  - The forceful exhalation was taught to her by Dr. Romero, her PCP  o laryngospasms result in stridor less than 20% of the time in the last year; prior to that it was more often  o Episodes are fairly unpredictable; many possible triggers  o A few remote nocturnal episodes of cough without laryngospasm; these are essentially resolved    Increasing humidity in house has helped    No episodes for several weeks in  January; then daily episodes after an exposure to smoke a week ago    Once triggered, the laryngospasm lasts a couple minutes, and then resolves over a few minutes, if she can avoid coughing    She had an episode right after a surgery    Has seen two ENT physicians over past few years because of this problem  o The first recommended Zantac; she has been taking that b.i.d for almost a year; she thinks it may be helping (she had more relief when she doubled her dose from once to twice a day)  o Both noted normal vocal fold mobility, one by flexible endoscopy and one by mirror exam  o An incidental endoscopy during work-up for facial pain also showed normal mobility in 2013    No referral for therapy from ENT physicians; learned of the possibility of Vocal Cord Dysfunction while querying online; communicated with Dr. Bob Keenan, SLP voice specialist at [facility], who recommended this clinic    Lifelong history weak voice; has to strain to raise volume  o Unsure if voice use is related to cough or laryngospasm    Needs to undergo surgery for cataracts, but surgeon prefers in-patient procedure due to potential for laryngospasm    Patient Supplied Answers To [employer] Intake General Questionnaire  [facility] Intake - General Form Review 2/8/2017   Are you having any of these symptoms? Throat irritation, Throat tightness, Frequent throat-clearing, Frequent cough   Do you use caffeine? No   How many oz. of non-caffeinated fluid do you typically drink in a day? 32 oz.   How often do you experience heartburn, indigestion, chest pain, stomach acid coming up, and/or tasting acid in your mouth or throat? Monthly   Have reflux medications been recommended to you? Yes   If yes, are you taking them regularly? Yes   Voice: No   Swallowing: No   Cough and/or throat-clearing: Yes   Breathing problem: No   A different problem, not listed above: Yes   Other physicians/health care providers who should receive a copy of today's note  (please provide first and last name(s), city): Dr. Jules Romero, my Internist, [facility], [phone]       Patient Supplied Answers To [facility] Intake Throat Discomfort Questionnaire  No flowsheet data found.    Patient Supplied Answers To [facility] Intake Cough/Throat-Clearing Questionnaire  [facility] Intake - Cough/Throat-Clearing Review 2/8/2017   How long have you had this cough/throat-clearing problem? Cough and larynx spasm for 25+yrs.   Tickle in throat and cough can lead to larynx spasm. I quickly take sugar free cough drop, begin to quickly exhale w/ pursed lips in exaggerated form.  Hard to breathe & talk.  Stopping cough is crucial.   Was there anything unusual about the time this cough/throat-clearing problem was first noticed (such as illness, accident, surgery, etc)? If yes, please describe.  You have 200 characters to respond.  We will ask for more detail at your visit. To continue with answer in previous question, sometimes a cough drop is enough to stop cough, but if not, exaggerated exhaling is essential as well as stopping cough. In the 1990's spasms seemed to be less frequent.   What do you think caused this cough/throat-clearing problem? Spasms seemed reduced after new higher humidity device installed in our house.  Super Ball Sunday at my son's home, the fireplace warmed us all...but that night spasms returned. Noticed last few months, wine stings throat so I dilute w/ water.    How quickly did the cough/throat-clearing problem develop? Gradually   How do the cough/throat-clearing symptoms vary? Worse in the PM, Worse with stress, Worse with exertion, Variable, Unpredictable, On and off   Over time, how has the cough/throat-clearing problem changed? Same   Is there a tickle in your throat prior to coughing or throat clearing? Yes   What % of the time do you feel like you can control the urge to cough? 50%   Do any of the following trigger your cough? Cold air, Laughing, Drinking,  Perfumes or strong smells, Trying to suppress a cough, Stress   If you have other triggers for your cough or throat-clearing issue, please list them here.  fireplace smoke, cold milk mar at lunch causes esophageal discomfort,  dry air   What prior treatments have been tried for this cough/throat-clearing issue? Cough suppressants   What health care providers have you seen for this cough/throat-clearing problem? Who and when? Dr. Romero, my Internist., Dr. Sam Carey, ENT 2014, 2015 (he now is on medical leave) , Dr. Carranza ENT 2016   For your cough/throat-clearing problem, what testing/studies have you done (such as imaging, reflux testing, lung function studies, allergy testing)? Nasopharyngeal endoscopy; everything was normal.  Dr. Carey:  May be some acid reflux spraying on larynx so he prescribed Zantac.  I now take 2x day, 1/2 - 1 hour before lunch and 2 hours after dinner.   Did you receive any therapy or treatment for this cough/throat-clearing problem?  If so, please briefly describe. No.    For your cough/throat-clearing problem, is there anything else you'd like to tell us?  I am referred to Dr. Vicente by Dr. Bob Keenan, Ph.D. at [facility] after reading about his treatment for larynx spasms.  His writings show breathing exercises help reduce or eliminate larynx spasms.,        Patient Supplied Answers To [facility] Intake Breathing Questionnaire  No flowsheet data found.    Patient Supplied Answers To [facility] Intake Voice Questionnaire  No flowsheet data found.    Patient Supplied Answers To VHI Questionnaire  Voice Handicap Index (VHI-10) 2/8/2017   How often do you have any of the following symptoms:  Indigestion, heartburn, chest pain, stomach acid coming up, and/or tasting acid in your mouth or throat? Monthly   (F1) My voice makes it difficult for people to hear me. Sometimes   (F2) People have difficulty understanding me in a noisy room. Sometimes   (F8) My voice difficulties restrict my  "personal and social life. Never   (F9) I feel left out of conversations because of my voice. Never   (F10) My voice problem causes me to lose income. Never   (P5) I feel as though I have to strain to produce voice. Sometimes   (P6) The clarity of my voice is unpredictable. Never   (E4) My voice problem upsets me. Almost never   (E6) My voice makes me feel handicapped. Never   (P3) People ask, \"What's wrong with your voice?\" Never   VHI Total Score 7       Patient Supplied Answers To CSI Questionnaire  No flowsheet data found.    Patient Supplied Answers To EAT Questionnaire  No flowsheet data found.    CURRENT PATIENT COMPLAINTS    Primary: cough    Secondary: laryngospasm    soft voice quality; needs to push or strain voice to      Throat clearing and irritation    Denies significant dyspnea, dysphagia, or pain    OTHER PERTINENT HISTORY    Please see scanned dictations in EPIC    Unremarkable to this problem    OBJECTIVE FINDINGS  VOICE/ SPEECH/ NON-COMMUNICATIVE LARYNGEAL BEHAVIORS EVALUATION  An evaluation of the voice and breathing was accomplished today; salient features are as follows:    Cough/ Throat clear:    Not observed today    Breathing pattern:    Appears within normal limits and adequate during conversational speech at rest    No dyspnea or laryngospasms observed today    No overt tension is evident.    Voice quality is characterized by    Mild roughness and strain; WNL for her age    Frequent glottal maldonado    Habitual pitch was not formally tested, but is judged to be WNL and appropriate    Loudness is WNL and appropriate for the setting    LARYNGEAL EXAMINATION    Endoscopic laryngeal examination was not deemed warranted today, as she has had laryngeal exams demonstrating normal vocal fold mobility.  She preferred to learn techniques to avoid or arrest an episode of vocal fold dysfunction.  If she is not able to use these techniques adequately, we will do an examination while eliciting symptoms, so " she can view compensatory strategies on the video monitor.  However, I did show her a number of laryngeal examination videos from our library of educational materials, so that she can understand the nature of vocal cord dysfunction, and how it can be remediated functionally.  She found this to be very helpful.      THERAPEUTIC ACTIVITIES  Today Ms. Vasquez participated in the following therapeutic activities:    [place] concepts and technqiues for using saline and plain-water gargling and saline nasal irrigation, steam, and guaifenesin to reduce the laryngeal irritation.    [place] concepts and techniques for improving topical and systemic hydration to improve health of the laryngeal mucosa    [place] concepts and strategies managing laryngopharyngeal reflux disorder, to reduce laryngeal inflammation.    I provided instruction for techniques and strategies to reduce the chronic cough/throat clearing  o alternative behaviors such as voiceless glottic coup, humming, swallowing, etc. were taught  o strategies for reducing mucosal irritation were taught    [place] techniques for optimal breathing technique.    [place] techniques for rescue breathing in case of laryngospasm, aka vocal cord dysfunction  o [place] to use inhalation through a straw, or through rounded lips or the nasal passages, to increase the constriction at the upstream portion of inhalation, in order to reduce constriction downstream (at the glottis)  o [place] to use an abdominal muscle use pattern, in order to reduce the propensity for vocal cord dysfunction caused by clavicular inhalation patterns  o Good learning during practice of breathing techniques; understands how to practice on her own    [place] an optimal practice regimen for rehabilitation exercises.  o she should use an interval schedule of practice, with brief periods of practice frequently throughout each day  o [place] concepts of volitional practice to facilitate motor  learning.    IMPRESSIONS/ RECOMMENDATIONS/ PLAN  IMPRESSIONS / RECOMMENDATIONS  Based on today's evaluation and initial therapy,  it appears that:    Cough/throat clear are accounted for by hypersensitivity of the larynx and pharynx as evidenced by case history, patient complaints and apparent absence of other organic findings; this leads to a diagnosis of irritable larynx syndrome that underlies her other disorders    Vocal cord dysfunction (aka laryngospasm) is an apparent manifestation of the cough and irritable larynx, and should be remediated along with the more global symptoms; rescue breathing techniques were taught today    A course of speech therapy is recommended to:  o help reduce laryngeal irritation and thereby reduce cough and throat clear  o Teach breathing techniques to prevent and/or arrest an episode of vocal fold dysfunction  o Reduce overall irritable larynx syndrome    she is entirely amenable to this plan    We began therapy today, working on strategies to improve vocal health and technique    RATIONALE: Current level of functioning is:  CJ - At least 20 percent but less than 40 percent impaired, limited, or restricted   based on Ms. Vasquez's extent of vocal cord dysfunction, extent of impact on function, extent of discomfort, level of effort.    TREATMENT PLAN  Speech therapy    DURATION/FREQUENCY OF TREATMENT  Six weekly, one-hour sessions, with two monthly one-hour follow-up sessions    PROGNOSIS  good prognosis for improvement in cough, vocal cord dysfunction, and irritable larynx syndrome, with speech therapy and regular practice of therapeutic activities.    BARRIERS TO LEARNING/TEACHING AND LEARNING NEEDS  None/Unremarkable    GOALS  Patient goal:    To reduce her cough to acceptable levels  To breathe normally and comfortably in all situations    Long-term goal(s): In 3 months, Ms. Vasquez will:  1.  Report a week of typcal activities with irritable larynx syndrome symptoms that do not  exceed a level of 3 out of 10, 80% of the time  2.  Report a week with no more than one episode of coughing per day, that does not last more than 2 seconds  3.  Report a week with no episodes of vocal cord dysfuncton (aka laryngospasm)    G-Codes   - Voice functional limitation, current status, at evaluation  CJ - At least 20 percent but less than 40 percent impaired, limited, or restricted     - Voice functional limitation, projected goal status, at discharge from therapy  CI - At least 1 percent but less than 20 percent impaired, limited, or restricted    PRIMARY ICD-10 code:  R05 (Chronic Cough)  SECONDARY ICD-10 code:  J38.3 (Vocal cord dysfunction)   TERTIARY ICD-10 code:  J38.7 (Irritable Larynx Syndrome)    TOTAL SERVICE TIME: 120 minutes  EVALUATION OF VOICE AND RESONANCE: (50942): 75 minutes    TREATMENT (93670): 45 minutes  NO CHARGE FACILITY FEE (84192)      Shiela Vicente, Ph.D., [facility]-SLP  Speech-Language Pathologist  Director, [facility]  [phone]              Again, thank you for allowing me to participate in the care of your patient.      Sincerely,    Shiela Vicente, SLP

## 2017-02-20 PROBLEM — J38.7 IRRITABLE LARYNX SYNDROME: Status: ACTIVE | Noted: 2017-02-20

## 2017-02-20 PROBLEM — R05.9 COUGH: Status: ACTIVE | Noted: 2017-02-20

## 2017-02-20 PROBLEM — J38.3 VOCAL CORD DYSFUNCTION: Status: ACTIVE | Noted: 2017-02-20

## 2017-03-02 ENCOUNTER — OFFICE VISIT (OUTPATIENT)
Dept: OTOLARYNGOLOGY | Facility: CLINIC | Age: 82
End: 2017-03-02

## 2017-03-02 DIAGNOSIS — R05.9 COUGH: Primary | ICD-10-CM

## 2017-03-02 DIAGNOSIS — J38.3 VOCAL CORD DYSFUNCTION: ICD-10-CM

## 2017-03-02 DIAGNOSIS — J38.7 IRRITABLE LARYNX SYNDROME: ICD-10-CM

## 2017-03-02 NOTE — MR AVS SNAPSHOT
After Visit Summary   3/2/2017    Dorina Vasquez    MRN: [id number]           Patient Information     Date Of Birth          4/24/1934        Visit Information        Provider Department      3/2/2017 1:30 PM Shiela Vicente SLP [facility]        Today's Diagnoses     Cough    -  1    Vocal cord dysfunction        Irritable larynx syndrome           Follow-ups after your visit        Your next 10 appointments already scheduled     Mar 14, 2017  1:30 PM CDT   (Arrive by 1:15 PM)   RETURN SLP VOICE with [facility]    [address] [place] [id number] [phone]            Apr 04, 2017  2:30 PM CDT   (Arrive by 2:15 PM)   RETURN SLP VOICE with [facility]    [address] [place] [id number] [phone]            Apr 25, 2017  2:30 PM CDT   (Arrive by 2:15 PM)   RETURN SLP VOICE with [facility]    [address] [place] [address] [phone]              Who to contact     Please call your clinic at [phone] to:    Ask questions about your health    Make or cancel appointments    Discuss your medicines    Learn about your test results    Speak to your doctor   If you have compliments or concerns about an experience at your clinic, or if you wish to file a complaint, please contact [facility] Patient Relations at [phone] or email us at [email or web address]         Additional Information About Your Visit        MyChart Information     [facility] gives you secure access to your electronic health record. If you see a primary care provider, you can also send messages to your care team and make appointments. If you have questions, please call your primary care clinic.  If you  do not have a primary care provider, please call [phone] and they will assist you.      [facility] is an electronic gateway that provides easy, online access to your medical records. With [facility], you can request a clinic appointment, read your test results, renew a prescription or communicate with your care team.     To access your existing account, please contact your [facility] or call [phone] for assistance.        Care EveryWhere ID     This is your Care EveryWhere ID. This could be used by other organizations to access your [facility] medical records  [id number]         Blood Pressure from Last 3 Encounters:   No data found for BP    Weight from Last 3 Encounters:   No data found for Wt              We Performed the Following     SPEECH/HEARING THERAPY, INDIVIDUAL        Primary Care Provider Office Phone # Fax #    Jules Romero [phone]       [facility] [address] [address]        Thank you!     Thank you for choosing [facility] [employer] [facility]  for your care. Our goal is always to provide you with excellent care. Hearing back from our patients is one way we can continue to improve our services. Please take a few minutes to complete the written survey that you may receive in the mail after your visit with us. Thank you!             Your Updated Medication List - Protect others around you: Learn how to safely use, store and throw away your medicines at www.disposemymeds.org.      Notice  As of 3/2/2017 11:59 PM    You have not been prescribed any medications.

## 2017-03-02 NOTE — LETTER
3/2/2017      RE: Dorina Vasquez  [address] [facility] [address]       [facility] VOICE [facility]  Brice Beck Jr., M.D., F.A.C.S.  Deana Stanley M.D., M.P.H.  Shiela Vicente, Ph.D., CCC/SLP  Elizabeth Martinez M.M. (voice), M.A., CCC/SLP  John Souza M.M. (voice), M.A., CCC/SLP    [facility]  VOICE/SPEECH/BREATHING THERAPY PROGRESS REPORT    Patient: Dorina Vasquez  Date of Service: 3/2/2017    PROGRESS SINCE LAST SESSION  I had the pleasure of seeing Ms. Vasquez for the first-evaluation/ therapy session on 2/09.  she was referred by Dr. Bob Keenan of the [facility], for medically necessary speech therapy to address a diagnosis of R05 (Cough) and J38.3 (Laryngospasm) and J38.7 (Irritable Larynx Syndrome).  This is her 2nd session including evaluation.      Ms. Vasquez states that:    She is trying all the recommendations for alternatives to cough/throat-clear; sipping and swallowing hard are both helpful    She has had 2 or 3 episodes of laryngospasm since 2/09, though none were of the the same magnitude as previous episodes  o One episode was not preceded by cough; this is new; she used her strategies and was able to regain control of her breathing quickly (less than a minute)    Ms. Vasquez presents today with the following:  Voice quality:    Pressed, with near-constant glottal maldonado, and poor airflow  Cough/ Throat clear:    Not observed today    THERAPEUTIC ACTIVITIES  Today Ms. Vasquez participated in the following therapeutic activities:    [place] concepts of the purposes for many of the strategies she is using; we went through the strategies one by one.  o This was in response to her questions    [place] exercises for improved airflow during phonation.  o Straw phonation was facilitating, in basic preliminary exercises  o She learned concepts of why working to reduce dysphonia will be helpful, and agreed to this as a treatment plan    IMPRESSIONS/GOALS/PLAN  Ms. Vasquez had a  productive session of therapy today, working on techniques/strategies/exercises that will help her achieve her goal of reducing cough, vocal cord dysfunction, and irritable larynx syndrome; allowing her to meet personal and professional vocal demands and fully engage in activities of daily living.   She will work on her exercises on a daily basis, and work on incorporating the techniques into her daily vocal activities.    Goals for this practice period:     practice all exercises according to instructions    Plan: I will see Ms. Vasquez in two weeks to work on education, modification, and carryover of therapeutic activities to more complex phonatory tasks.    G-Codes were reported on 2/09    PRIMARY ICD-10 code: R05 (Cough)  SECONDARY ICD-10 code:  J38.3 (Vocal cord dysfunction)  TERTIARY ICD-10 code: J38.7 (Irritable Larynx Syndrome)     TOTAL SERVICE TIME: 75 minutes  TREATMENT (78033): 75 minutes  NO CHARGE FACILITY FEE (08733)    Shiela Vicente, Ph.D., [facility]-SLP  Speech-Language Pathologist  Director, [facility]  [phone]

## 2017-03-02 NOTE — PROGRESS NOTES
[facility] VOICE [facility]  Brice Beck Jr., M.D., F.A.C.S.  Deana Stanley M.D., M.P.H.  Shiela Vicente, Ph.D., CCC/SLP  Elizabeth Martinez M.M. (voice), M.A., CCC/SLP  John Souza M.M. (voice), M.A., [facility]/SLP    [facility] VOICE [facility]  VOICE/SPEECH/BREATHING THERAPY PROGRESS REPORT    Patient: Dorina Vasquez  Date of Service: 3/2/2017    PROGRESS SINCE LAST SESSION  I had the pleasure of seeing Ms. Vasquez for the first-evaluation/ therapy session on 2/09.  she was referred by Dr. Bob Keenan of the [facility], for medically necessary speech therapy to address a diagnosis of R05 (Cough) and J38.3 (Laryngospasm) and J38.7 (Irritable Larynx Syndrome).  This is her 2nd session including evaluation.      Ms. Vasquez states that:    She is trying all the recommendations for alternatives to cough/throat-clear; sipping and swallowing hard are both helpful    She has had 2 or 3 episodes of laryngospasm since 2/09, though none were of the the same magnitude as previous episodes  o One episode was not preceded by cough; this is new; she used her strategies and was able to regain control of her breathing quickly (less than a minute)    Ms. Vasquez presents today with the following:  Voice quality:    Pressed, with near-constant glottal maldonado, and poor airflow  Cough/ Throat clear:    Not observed today    THERAPEUTIC ACTIVITIES  Today Ms. Vasquez participated in the following therapeutic activities:    Mayfield concepts of the purposes for many of the strategies she is using; we went through the strategies one by one.  o This was in response to her questions    Mayfield exercises for improved airflow during phonation.  o Straw phonation was facilitating, in basic preliminary exercises  o She learned concepts of why working to reduce dysphonia will be helpful, and agreed to this as a treatment plan    IMPRESSIONS/GOALS/PLAN  Ms. Vasquez had a productive session of therapy today, working on techniques/strategies/exercises that will  help her achieve her goal of reducing cough, vocal cord dysfunction, and irritable larynx syndrome; allowing her to meet personal and professional vocal demands and fully engage in activities of daily living.   She will work on her exercises on a daily basis, and work on incorporating the techniques into her daily vocal activities.    Goals for this practice period:     practice all exercises according to instructions    Plan: I will see Ms. Vasquez in two weeks to work on education, modification, and carryover of therapeutic activities to more complex phonatory tasks.    G-Codes were reported on 2/09    PRIMARY ICD-10 code: R05 (Cough)  SECONDARY ICD-10 code:  J38.3 (Vocal cord dysfunction)  TERTIARY ICD-10 code: J38.7 (Irritable Larynx Syndrome)     TOTAL SERVICE TIME: 75 minutes  TREATMENT (35276): 75 minutes  NO CHARGE FACILITY FEE (31129)    Shiela Vicente, Ph.D., [facility]-SLP  Speech-Language Pathologist  Director, [facility]  [phone]

## 2017-03-14 NOTE — PROGRESS NOTES
Outpatient Speech Language Therapy Evaluation  PLAN OF TREATMENT FOR OUTPATIENT REHABILITATION  (COMPLETE FOR INITIAL CLAIMS ONLY)  Patient's Last Name, First Name, M.I.  YOB: 1934  Dorina Vasquez                        Provider's Name  Shiela Vicente Medical Record No.  [id number]                               Onset Date:  2/09/17   Start of Care Date: 2/09/17     Type: Speech Language Therapy Medical Diagnosis: Cough [R05]                        Therapy Diagnosis:  Cough [R05]   Visits from SOC:  1   _________________________________________________________________________________  Plan of Treatment:   Speech therapy    Please see the Treatment Plan in the progress note from 2/09/17.  _________________________________________________________________________________    I CERTIFY THE NEED FOR THESE SERVICES FURNISHED UNDER        THIS PLAN OF TREATMENT AND WHILE UNDER MY CARE     (Physician attestation of this document indicates review and certification of the therapy plan).     Certification date from: 2/09/17  Certification date to: 5/10/17    Referring Provider: Tiff Self

## 2017-04-04 ENCOUNTER — OFFICE VISIT (OUTPATIENT)
Dept: OTOLARYNGOLOGY | Facility: CLINIC | Age: 82
End: 2017-04-04

## 2017-04-04 DIAGNOSIS — J38.7 IRRITABLE LARYNX SYNDROME: ICD-10-CM

## 2017-04-04 DIAGNOSIS — R05.9 COUGH: Primary | ICD-10-CM

## 2017-04-04 DIAGNOSIS — J38.3 VOCAL CORD DYSFUNCTION: ICD-10-CM

## 2017-04-04 NOTE — PROGRESS NOTES
[facility] VOICE [facility]  Brice Beck Jr., M.D., F.A.C.S.  Deana Stanley M.D., M.P.H.  Shiela Vicente, Ph.D., CCC/SLP  Elizabeth Martinez M.M. (voice), M.A., CCC/SLP  John Souza M.M. (voice), M.A., [facility]/SLP    [facility] VOICE [facility]  VOICE/SPEECH/BREATHING THERAPY PROGRESS REPORT    Patient: Dorina Vasquez  Date of Service: 4/4/2017    PROGRESS SINCE LAST SESSION  Ms. Vasquez was last seen on 3/02. At that time, she worked on learning therapeutic activities to improve her voice quality and comfort, secondary to cough, vocal cord dysfunction, and irritable larynx syndrome; allowing  her to meet personal and professional vocal demands and fully engage in activities of daily living.    Regarding practice, Ms. Vasquez reports the following:     She is practicing the breathing exercises daily, and throughout the day as she thinks about it    She hasn't been doing the straw exercise because her voice sounds worse and she doesn't like the sensation    Ms. Vasquez also states that:    She dropped the second Zantac, which seemed to be causing constipation and insomnia, which also seemed related to her cough  o She has noticed a reduction in the cough in the past two weeks    She changed to different sugar-free lozenges, which stop the cough quickly    She has had some episodes of cough and tickle in which she did her forced rapid exhalation exercise, and did not progress to laryngospasm  o She did not notice any particular pattern to the triggers for the cough  o The hard swallow technique also worked well, usually prior to the forced exhale    Talking loud is an effort, and she notes that others have a hard time hearing her     Ms. Vasquez presents today with the following:  Voice quality:    Moderately pressed, with poor airflow and frequent glottal maldonado    Mild backward focus    THERAPEUTIC ACTIVITIES  Today Ms. Vasquez participated in the following therapeutic activities:    Jennette exercises to experience improved airflow and a  more forward sensation during phonation.  o speech material with nasal continuants was facilitating  o able to recognize improvement in quality and comfort  o able to progress to level of sentences  o good learning, but will need practice    Deadwood concepts of an optimal regimen for practice.  o she should use an interval schedule of practice, with brief periods of practice frequently throughout each day  o [place]wood concepts of volitional practice to facilitate motor learning.    An audio recording of today's therapeutic activities was provided, to facilitate practice.    IMPRESSIONS/GOALS/PLAN  Ms. Vasquez is making progress in reducing her cough, vocal cord dysfunction, and irritable larynx syndrome.  We have determined that her voice use is probably a factor, and today learned exercises for improved airflow and resonance, to reduce muscle tension that is a source of irritation.  She will continue to work on her exercises on a daily basis, and work on incorporating the techniques into her daily activities.    Goals for this practice period:     practice all exercises according to instructions    incorporate techniques into daily activities    maintain vigilance for vocal technique    Plan: I will see Ms. Vasquez in 3 weeks to work on education, modification, and carryover of therapeutic activities to more complex phonatory tasks.    G-Codes were reported on 2/09/17    PRIMARY ICD-10 code: R05 (Cough)  SECONDARY ICD-10 code: J38.3 (Vocal cord dysfunction)   TERTIARY ICD-10 code: J38.7 (Irritable Larynx Syndrome)    TOTAL SERVICE TIME: 60 minutes  TREATMENT (01802): 60 minutes  NO CHARGE FACILITY FEE (25157)    Shiela iVcente, Ph.D., [facility]-SLP  Speech-Language Pathologist  Director, [facility]  [phone]

## 2017-04-04 NOTE — MR AVS SNAPSHOT
After Visit Summary   4/4/2017    Dorina Vasquez    MRN: [id number]           Patient Information     Date Of Birth          4/24/1934        Visit Information        Provider Department      4/4/2017 2:30 PM Shiela Vicente SLP [facility]        Today's Diagnoses     Cough    -  1    Vocal cord dysfunction        Irritable larynx syndrome           Follow-ups after your visit        Your next 10 appointments already scheduled     Apr 25, 2017  2:30 PM CDT   (Arrive by 2:15 PM)   RETURN KRISTIAN MIRANDA with [facility] [employer] [facility] ([facility])    [address] [place] [id number] [phone]              Who to contact     Please call your clinic at [phone] to:    Ask questions about your health    Make or cancel appointments    Discuss your medicines    Learn about your test results    Speak to your doctor   If you have compliments or concerns about an experience at your clinic, or if you wish to file a complaint, please contact [facility] Patient Relations at [phone] or email us at [email or web address]         Additional Information About Your Visit        MyChart Information     [employer] gives you secure access to your electronic health record. If you see a primary care provider, you can also send messages to your care team and make appointments. If you have questions, please call your primary care clinic.  If you do not have a primary care provider, please call [phone] and they will assist you.      [employer] is an electronic gateway that provides easy, online access to your medical records. With [employer], you can request a clinic appointment, read your test results, renew a prescription or communicate with your care team.     To access your existing account, please contact your [facility] or call [phone] for assistance.        Care EveryWhere  ID     This is your Care EveryWhere ID. This could be used by other organizations to access your [facility] medical records  [id number]         Blood Pressure from Last 3 Encounters:   No data found for BP    Weight from Last 3 Encounters:   No data found for Wt              We Performed the Following     SPEECH/HEARING THERAPY, INDIVIDUAL        Primary Care Provider Office Phone # Fax #    Jules Romero [phone]       [facility] [address] [address]        Thank you!     Thank you for choosing [employer]  for your care. Our goal is always to provide you with excellent care. Hearing back from our patients is one way we can continue to improve our services. Please take a few minutes to complete the written survey that you may receive in the mail after your visit with us. Thank you!             Your Updated Medication List - Protect others around you: Learn how to safely use, store and throw away your medicines at www.disposemymeds.org.      Notice  As of 4/4/2017 11:59 PM    You have not been prescribed any medications.

## 2017-04-25 ENCOUNTER — OFFICE VISIT (OUTPATIENT)
Dept: OTOLARYNGOLOGY | Facility: CLINIC | Age: 82
End: 2017-04-25

## 2017-04-25 DIAGNOSIS — J38.3 VOCAL CORD DYSFUNCTION: ICD-10-CM

## 2017-04-25 DIAGNOSIS — R05.9 COUGH: Primary | ICD-10-CM

## 2017-04-25 DIAGNOSIS — J38.7 IRRITABLE LARYNX SYNDROME: ICD-10-CM

## 2017-04-25 NOTE — MR AVS SNAPSHOT
After Visit Summary   4/25/2017    Dorina Vasquez    MRN: [id number]           Patient Information     Date Of Birth          4/24/1934        Visit Information        Provider Department      4/25/2017 2:30 PM Shiela Vicente, SLP [facility]        Today's Diagnoses     Cough    -  1    Vocal cord dysfunction        Irritable larynx syndrome           Follow-ups after your visit        Who to contact     Please call your clinic at [phone] to:    Ask questions about your health    Make or cancel appointments    Discuss your medicines    Learn about your test results    Speak to your doctor   If you have compliments or concerns about an experience at your clinic, or if you wish to file a complaint, please contact [facility] Patient Relations at [phone] or email us at [email or web address]         Additional Information About Your Visit        MyChart Information     [employer] gives you secure access to your electronic health record. If you see a primary care provider, you can also send messages to your care team and make appointments. If you have questions, please call your primary care clinic.  If you do not have a primary care provider, please call [phone] and they will assist you.      [employer] is an electronic gateway that provides easy, online access to your medical records. With [employer], you can request a clinic appointment, read your test results, renew a prescription or communicate with your care team.     To access your existing account, please contact your [facility] or call [phone] for assistance.        Care EveryWhere ID     This is your Care EveryWhere ID. This could be used by other organizations to access your [facility] medical records  [id number]         Blood Pressure from Last 3 Encounters:   No data found for BP    Weight from Last 3 Encounters:   No data found for Wt              We  Performed the Following     SPEECH/HEARING THERAPY, INDIVIDUAL        Primary Care Provider Office Phone # Fax #    Jules Romero [phone]       [facility] [address] [address]        Thank you!     Thank you for choosing [email or web address]  for your care. Our goal is always to provide you with excellent care. Hearing back from our patients is one way we can continue to improve our services. Please take a few minutes to complete the written survey that you may receive in the mail after your visit with us. Thank you!             Your Updated Medication List - Protect others around you: Learn how to safely use, store and throw away your medicines at www.disposemymeds.org.      Notice  As of 4/25/2017 11:59 PM    You have not been prescribed any medications.

## 2017-04-25 NOTE — PROGRESS NOTES
"[facility]  Brice Beck Jr., M.D., F.A.C.S.  Deana Stanley M.D., M.P.H.  Shiela Vicente, Ph.D., CCC/SLP  Elizabeth Martinez M.M. (voice), M.A., CCC/SLP  John Souza M.M. (voice), M.JESI., [facility]/SLP    [facility] VOICE [facility]  VOICE/SPEECH/BREATHING THERAPY PROGRESS REPORT    Patient: Dorina Vasquez  Date of Service: 4/25/2017    PROGRESS SINCE LAST SESSION  Ms. Vasquez was last seen on 4/04. At that time, she worked on learning therapeutic activities to reduce her cough, vocal cord dysfunction, and irritable larynx syndrome; allowing  her to meet personal vocal demands and fully engage in activities of daily living.    Regarding practice, Ms. Vasquez reports the following:     She is practicing her breathing and forward resonance exercises several times a day    Ms. Vasquez also states that:    The abdominal breathing techniques have helped her project her voice, and also she is having less \"tickling\" in her throat    The /m/ sound does seem to be facilitating    Her cough and throat irritation have reduced      Ms. Vasquez presents today with the following:    Slightly improved airflow and forward focus compared to last session    Pitch is still quite low, but she can change her pitch and resonance easily    THERAPEUTIC ACTIVITIES  Today Ms. Vasquez participated in the following therapeutic activities:    Asked many questions about the nature of her symptoms, and I answered all of these thoroughly.    Sisters new exercises to promote forward resonance during conversational speech.  o She demonstrated good forward resonance with minimal clinician cueing at the sentence and conversation level, however there is minimal to moderate carryover to non-clinical tasks.  o Bilabial nasal sounds were facilitative  o Good learning and good improvement by end of session, but will require further practice    I provided handouts to help facilitate practice.    An audio recording of today's therapeutic activities was provided, to " facilitate practice.    IMPRESSIONS/GOALS/PLAN  Ms. Vasquez had a productive session of therapy today, working on techniques/strategies/exercises that will help her achieve her goal of reducing cough, vocal cord dysfunction, and irritable larynx syndrome; allowing her to meet personal vocal demands and fully engage in activities of daily living. She will continue to work on her exercises on a daily basis, and work on incorporating the techniques into her daily vocal activities.    Goals for this practice period:     practice all exercises according to instructions    incorporate techniques into daily vocal activities    maintain vigilance for vocal technique    Plan: I will see Ms. Vasquez in a month to work on education, modification, and carryover of therapeutic activities to more complex phonatory tasks.     G-Codes were reported on 2/09/17    PRIMARY ICD-10 code: R05 (Cough)  SECONDARY ICD-10 code: J38.3 (Vocal cord dysfunction)   TERTIARY ICD-10 code: J38.7 (Irritable Larynx Syndrome)     TOTAL SERVICE TIME: 60 minutes  TREATMENT (40094): 60 minutes  NO CHARGE FACILITY FEE (96570)    Shiela Vicente, Ph.D., [facility]-SLP  Speech-Language Pathologist  Director, [facility]  [phone]    SUKH Barrera.  Speech Pathology Intern

## 2017-06-02 ENCOUNTER — OFFICE VISIT (OUTPATIENT)
Dept: OTOLARYNGOLOGY | Facility: CLINIC | Age: 82
End: 2017-06-02

## 2017-06-02 DIAGNOSIS — J38.3 VOCAL CORD DYSFUNCTION: ICD-10-CM

## 2017-06-02 DIAGNOSIS — J38.7 IRRITABLE LARYNX SYNDROME: ICD-10-CM

## 2017-06-02 DIAGNOSIS — R05.9 COUGH: Primary | ICD-10-CM

## 2017-06-02 NOTE — PROGRESS NOTES
"[facility] VOICE [facility]  Brice Beck Jr., M.D., F.A.C.S.  Deana Stanley M.D., M.P.H.  Shiela Vicente, Ph.D., CCC/SLP  Elizabeth Martinez M.M. (voice), M.A., CCC/SLP  John Souza M.M. (voice), M.JESI., [facility]/SLP    [facility]  VOICE/SPEECH/BREATHING THERAPY PROGRESS REPORT    Patient: Dorina Vasquez  Date of Service: 6/2/2017  Date of Last Service: 4/25/17    I had the pleasure of seeing Ms. Vasquez today, for the 5th  therapy session (CPT code 19926).  She was referred by Dr. Keenan, for medically necessary speech therapy to address a diagnosis of:  R05 (Chronic Cough)  J38.3 (Vocal Cord Dysfunction) and J38.7 (Irritable Larynx Syndrome).  Please refer to her initial evaluation report of 2/9/17 for complete details regarding diagnostic findings.    PROGRESS SINCE LAST SESSION  At the last session, Ms. Vasquez worked on therapeutic activities to improve her voice quality and comfort, in order to help reduce her diagnoses; allowing  her to meet personal and professional vocal demands and fully engage in activities of daily living.    Regarding practice, Ms. Vasquez reports the following:     Irregular practice with and without the recording    She thinks the exercises are helpful, but has questions    Ms. Vasquez also states that:    She developed GI problems, possibly related to the use of sugar-free lozenges  o She was put on a lactose-free diet, which she has since discontinued  o She continues to feel that sucking on lozenges is important, but she is trying to reduce the amount she uses    She is having \"far fewer\" episodes of vocal cord dysfunction; she expects this due to some combination of catching the tickle earlier, using the strategies for reduced coughing,  and perhaps the vocal exercises that are helping her reduce her laryngeal tension  o She hasn't had a full-blown episode since her last session 5 weeks ago     Regarding her long-term goals:  1) her irritable larynx symptoms are at a level of 3 and " below at least 80% of the time; goal met  2) she reports that she does not cough every day, and does not cough for more than two seconds more than once a day; goal met  3) she has not had a laryngospasm for 5 weeks; goal met      Ms. Vasquez presents today with the following:  Voice quality:    Much less glottal maldonado and inadequate airflow than previously; about 50% more use of clear tone    THERAPEUTIC ACTIVITIES  Today Ms. Vasquez participated in the following therapeutic activities:    Asked many questions about the nature of her symptoms, and I answered all of these thoroughly.  o She wonders about nasal vs oral breathing, the etiology of the tickle in her throat, which exercises to continue doing, and what regimen to use for exercises  o I instructed her as to a regimen for gradual weaning of the voice exercises, if her symptoms continue to be minimal  o She should continue thinking about her breathing technique at least daily until she has been symptom free for at least a month  o She now has better understanding of the various facets of her disorder and the treatment, especially the role of voice exercises in treating cough, VCD, and ILS    She took thorough notes on our entire discussion    IMPRESSIONS/GOALS/PLAN  Ms. Vasquez had a productive session of therapy today, working on techniques/strategies/exercises that will help her achieve her goal of reducing  R05 (Chronic Cough)   J38.3 (Vocal Cord Dysfunction)   J38.7 (Irritable Larynx Syndrome)  And achieving reduced dysphonia; allowing her to fully engage in activities of daily living.   She will continue to work on her exercises on a daily basis, and work on incorporating the techniques into her daily activities.    Progress toward long-term goals: (Reporting period: 2/09/17  to 6/02/17)  Good progress; please see above    Goals for this practice period:     practice all exercises according to instructions    incorporate techniques into daily vocal  activities    maintain vigilance for vocal technique    Plan: I will see Ms. Vasquez in 6 weeks to ensure that she is maintaining her improvements and understands her treatment regimen ongoing, then we hope to discharge.    G-Codes were reported on 2/09/17    PRIMARY ICD-10 code:  R05 (Chronic Cough)  SECONDARY ICD-10 code:  J38.3 ((Vocal Cord Dysfunction))   TERTIARY ICD-10 code:  J38.7 (Irritable Larynx Syndrome)      TOTAL SERVICE TIME: 60 minutes  TREATMENT (35949): 60 minutes  NO CHARGE FACILITY FEE (63916)    Shiela Vicente, Ph.D., [facility]SLP  Speech-Language Pathologist  Director, [facility]  [phone]

## 2017-06-02 NOTE — MR AVS SNAPSHOT
After Visit Summary   6/2/2017    Dorina Vasquez    MRN: [id number]           Patient Information     Date Of Birth          4/24/1934        Visit Information        Provider Department      6/2/2017 2:30 PM Shiela Vicente SLP [facility]        Today's Diagnoses     Cough    -  1    Vocal cord dysfunction        Irritable larynx syndrome           Follow-ups after your visit        Your next 10 appointments already scheduled     Jul 18, 2017  2:30 PM CDT   (Arrive by 2:15 PM)   RETURN KRISTIAN MIRANDA with KRISTIAN Parrish [facility] ([facility])    [address] [place] [address] [phone]              Who to contact     Please call your clinic at [phone] to:    Ask questions about your health    Make or cancel appointments    Discuss your medicines    Learn about your test results    Speak to your doctor   If you have compliments or concerns about an experience at your clinic, or if you wish to file a complaint, please contact [facility] Patient Relations at [phone] or email us at [email or web address]         Additional Information About Your Visit        MyChart Information     [employer] gives you secure access to your electronic health record. If you see a primary care provider, you can also send messages to your care team and make appointments. If you have questions, please call your primary care clinic.  If you do not have a primary care provider, please call [phone] and they will assist you.      [employer]t is an electronic gateway that provides easy, online access to your medical records. With [employer], you can request a clinic appointment, read your test results, renew a prescription or communicate with your care team.     To access your existing account, please contact your [facility] or call [phone] for assistance.        Care EveryWhere  ID     This is your Care EveryWhere ID. This could be used by other organizations to access your [place] medical records  [id number]         Blood Pressure from Last 3 Encounters:   No data found for BP    Weight from Last 3 Encounters:   No data found for Wt              We Performed the Following     SPEECH/HEARING THERAPY, INDIVIDUAL        Primary Care Provider Office Phone # Fax #    Jules Romero [phone]       [facility] [address] [address]        Thank you!     Thank you for choosing [facility]  for your care. Our goal is always to provide you with excellent care. Hearing back from our patients is one way we can continue to improve our services. Please take a few minutes to complete the written survey that you may receive in the mail after your visit with us. Thank you!             Your Updated Medication List - Protect others around you: Learn how to safely use, store and throw away your medicines at www.disposemymeds.org.      Notice  As of 6/2/2017  4:12 PM    You have not been prescribed any medications.

## 2017-06-13 ENCOUNTER — OFFICE VISIT (OUTPATIENT)
Dept: OTOLARYNGOLOGY | Facility: CLINIC | Age: 82
End: 2017-06-13

## 2017-06-13 DIAGNOSIS — J38.7 IRRITABLE LARYNX SYNDROME: ICD-10-CM

## 2017-06-13 DIAGNOSIS — R05.9 COUGH: Primary | ICD-10-CM

## 2017-06-13 DIAGNOSIS — J38.3 VOCAL CORD DYSFUNCTION: ICD-10-CM

## 2017-06-13 NOTE — LETTER
6/13/2017      RE: Dorina Vasquez  [address] [address]       [facility]  Brice Beck Jr., M.D., F.A.C.S.  Deana Stanley M.D., M.P.H.  Shiela Vicente, Ph.D., CCC/SLP  Elizabeth Martinez M.M. (voice), M.A., CCC/SLP  John Souza M.M. (voice), M.A., CCC/SLP    [facility]  VOICE/SPEECH/BREATHING THERAPY PROGRESS REPORT    Patient: Dorina Vasquez  Date of Service: 6/13/2017  Date of Last Service: 6/02    I had the pleasure of seeing Ms. Vasquez today, for the 6th  therapy session (CPT code 38562).  She was referred by Dr. Bob Keenan of the [facility], for medically necessary speech therapy to address a diagnosis of:  R05 (Chronic Cough)   J38.3 (Paradoxial Vocal Fold Motion)   J.38.7 (Irritable Larynx Syndrome).  Please refer to her initial evaluation report of 2/09/17 for complete details regarding diagnostic findings.    PROGRESS SINCE LAST SESSION  At the last session, Ms. Vasquez worked on therapeutic activities to reduce her laryngospasm by reducing cough and other laryngeal irritants, including muscle tension dysphonia; allowing  her to meet personal and professional vocal demands and fully engage in activities of daily living.    Regarding practice, Ms. Vasquez reports the following:     She has continued to do her regimen of exercises faithfully, although she has not done any specific breathing exercises to arrest a laryngospasm, as she feels that the rapid blowing has been helpful enough for her    Ms. Vasquez also states that:    She had a very episode of laryngospasm last week, after gardening and being exposed to a pesticide; it took a long time to get the episode under control  o This was frightening and frustrating to her, as she thought she had gotten the problem under control    She now worries that she does not have adequate strategies to arrest an episode, and is especially worried about scheduling a cataract surgery, as her ophthalmologist is  concerned about her having an episode during the surgery    Ms. Vasquez presents today with the following:    Comfortable breathing at rest; adequate technique for inhalation and exhalation  Voice quality:    Mildly pressed; less glottal maldonado than she has had in the past  o She states she is trying to be aware of her voice    THERAPEUTIC ACTIVITIES  Today Ms. Vasquez participated in the following therapeutic activities:    [place] new exercises to promote normal breathing, and to arrest an episode of laryngospasm  o We worked on concentrating more on abdominal movement as part of the process of inhalation  o She worked on short repeated sniffs to abduct the vocal folds, and also inhaling through a straw; both strategies seemed useful.  o She worked on contrasting vocal fold adduction for phonation with abduction for inhalation as part of the breathing exercises    I provided instruction for practicing the new regimen of exercises like a fire drill, with many brief practice periods over the new few weeks.    [place] concepts of rescue breathing strategies in the case of conscious sedation  o She was quite concerned that if she had a laryngospasm in the hospital she would automatically undergo tracheostomy.    In response to her questions, I provided explanations of medical responses to laryngospasm  o She will discuss these with her PCP and her ophthalmologist.    As usual, she took copious notes during our discussion    IMPRESSIONS/GOALS/PLAN  Ms. Vasquez had a productive session of therapy today, working on techniques/strategies/exercises that will help her achieve her goal of reducing  R05 (Chronic Cough)   J38.3 (Paradoxial Vocal Fold Motion)   J38.7 (Irritable Larynx Syndrome  with a goal of eliminating her episodes of laryngospasm.   She will continue to work on her exercises on a daily basis, especially the new exercises, and work on incorporating the techniques into her daily activities.    Progress toward long-term  goals: (Reporting period: 2/09/17  to 6/13/17)  Adequate progress; we will make objective measures at her next session    Goals for this practice period:     practice all exercises according to instructions    maintain vigilance for breathing technique    Plan: I will see Ms. Vasquez in a month to work on education, modification, and carryover of therapeutic activities to more complex phonatory tasks.    G-Codes were reported on 2/09/17    PRIMARY ICD-10 code:  R05 (Chronic Cough)  SECONDARY ICD-10 code:  J38.3 (Paradoxial Vocal Fold Motion)   TERTIARY ICD-10 code:  J38.7 (Irritable Larynx Syndrome)    TOTAL SERVICE TIME: 60 minutes  TREATMENT (10280): 60 minutes  NO CHARGE FACILITY FEE (91993)    Shiela Vicente, Ph.D., [facility]-SLP  Speech-Language Pathologist  Director, [facility]  [phone]                                                                                                     Outpatient Speech Language Therapy Evaluation  PLAN OF TREATMENT FOR OUTPATIENT REHABILITATION  Patient's Last Name, First Name, M.I.  YOB: 1934  Dorina Vasquez                        Provider's Name  Shiela Vicente Medical Record No.  [id number]                               Onset Date:  2/09/17   Start of Care Date: 2/09/17     Type: Speech Language Therapy Medical Diagnosis: No primary diagnosis found.                        Therapy Diagnosis:  Chronic Cough (R05).   Visits from SOC:  6   _________________________________________________________________________________  Plan of Treatment:   Speech therapy    DURATION/FREQUENCY OF TREATMENT  Four more monthly one-hour follow-up sessions    PROGNOSIS  Good prognosis for reduction in sympotms with speech therapy and regular practice of therapeutic activities.    BARRIERS TO LEARNING/TEACHING AND LEARNING NEEDS  None/Unremarkable    Goals:  Please see goals of 2/09/17, as we will continue these goals until discharge    I CERTIFY THE NEED FOR THESE  SERVICES FURNISHED UNDER        THIS PLAN OF TREATMENT AND WHILE UNDER MY CARE     (Physician attestation of this document indicates review and certification of the therapy plan).     Certification date from: 5/11/17  Certification date to: 8/11/17    Referring Provider: Tiff Vicente, SLP

## 2017-06-13 NOTE — MR AVS SNAPSHOT
After Visit Summary   6/13/2017    Dorina Vasquez    MRN: [id number]           Patient Information     Date Of Birth          4/24/1934        Visit Information        Provider Department      6/13/2017 1:30 PM Shiela Vicente SLP [facility]        Today's Diagnoses     Cough    -  1    Vocal cord dysfunction        Irritable larynx syndrome           Follow-ups after your visit        Your next 10 appointments already scheduled     Jul 18, 2017  2:30 PM CDT   (Arrive by 2:15 PM)   RETURN KRISTIAN MIRANDA with KRISTIAN Parrish [facility] ([facility])    [address] [place] [address] [phone]              Who to contact     Please call your clinic at [phone] to:    Ask questions about your health    Make or cancel appointments    Discuss your medicines    Learn about your test results    Speak to your doctor   If you have compliments or concerns about an experience at your clinic, or if you wish to file a complaint, please contact [facility] Patient Relations at [phone] or email us at [email or web address]         Additional Information About Your Visit        MyChart Information     Edge[employer] gives you secure access to your electronic health record. If you see a primary care provider, you can also send messages to your care team and make appointments. If you have questions, please call your primary care clinic.  If you do not have a primary care provider, please call [phone] and they will assist you.      [employer] is an electronic gateway that provides easy, online access to your medical records. With [employer], you can request a clinic appointment, read your test results, renew a prescription or communicate with your care team.     To access your existing account, please contact your [facility] or call [phone] for assistance.        Care  EveryWhere ID     This is your Care EveryWhere ID. This could be used by other organizations to access your [place] medical records  [id number]         Blood Pressure from Last 3 Encounters:   No data found for BP    Weight from Last 3 Encounters:   No data found for Wt              We Performed the Following     SPEECH/HEARING THERAPY, INDIVIDUAL        Primary Care Provider Office Phone # Fax #    Jules Romero [phone]       [facility] [address] [address]        Thank you!     Thank you for choosing [facility]  for your care. Our goal is always to provide you with excellent care. Hearing back from our patients is one way we can continue to improve our services. Please take a few minutes to complete the written survey that you may receive in the mail after your visit with us. Thank you!             Your Updated Medication List - Protect others around you: Learn how to safely use, store and throw away your medicines at www.disposemymeds.org.      Notice  As of 6/13/2017 11:59 PM    You have not been prescribed any medications.

## 2017-06-13 NOTE — PROGRESS NOTES
[place] [facility]  Brice Beck Jr., M.D., F.A.C.S.  Deana Stanley M.D., M.P.H.  Shiela Vicente, Ph.D., CCC/SLP  Elizabeth Martinez M.M. (voice), M.A., CCC/SLP  John Souza M.M. (voice), M.A., [facility]/SLP    [place] [facility]  VOICE/SPEECH/BREATHING THERAPY PROGRESS REPORT    Patient: Dorina Vasquez  Date of Service: 6/13/2017  Date of Last Service: 6/02    I had the pleasure of seeing Ms. Vasquez today, for the 6th  therapy session (CPT code 99498).  She was referred by Dr. Bob Keenan of the [facility], for medically necessary speech therapy to address a diagnosis of:  R05 (Chronic Cough)   J38.3 (Paradoxial Vocal Fold Motion)   J.38.7 (Irritable Larynx Syndrome).  Please refer to her initial evaluation report of 2/09/17 for complete details regarding diagnostic findings.    PROGRESS SINCE LAST SESSION  At the last session, Ms. Vasquez worked on therapeutic activities to reduce her laryngospasm by reducing cough and other laryngeal irritants, including muscle tension dysphonia; allowing  her to meet personal and professional vocal demands and fully engage in activities of daily living.    Regarding practice, Ms. Vasquez reports the following:     She has continued to do her regimen of exercises faithfully, although she has not done any specific breathing exercises to arrest a laryngospasm, as she feels that the rapid blowing has been helpful enough for her    Ms. Vasquez also states that:    She had a very episode of laryngospasm last week, after gardening and being exposed to a pesticide; it took a long time to get the episode under control  o This was frightening and frustrating to her, as she thought she had gotten the problem under control    She now worries that she does not have adequate strategies to arrest an episode, and is especially worried about scheduling a cataract surgery, as her ophthalmologist is concerned about her having an episode during the surgery    Ms. Vasquez presents today with  the following:    Comfortable breathing at rest; adequate technique for inhalation and exhalation  Voice quality:    Mildly pressed; less glottal maldonado than she has had in the past  o She states she is trying to be aware of her voice    THERAPEUTIC ACTIVITIES  Today Ms. Vasquez participated in the following therapeutic activities:    [place] new exercises to promote normal breathing, and to arrest an episode of laryngospasm  o We worked on concentrating more on abdominal movement as part of the process of inhalation  o She worked on short repeated sniffs to abduct the vocal folds, and also inhaling through a straw; both strategies seemed useful.  o She worked on contrasting vocal fold adduction for phonation with abduction for inhalation as part of the breathing exercises    I provided instruction for practicing the new regimen of exercises like a fire drill, with many brief practice periods over the new few weeks.    [place] concepts of rescue breathing strategies in the case of conscious sedation  o She was quite concerned that if she had a laryngospasm in the hospital she would automatically undergo tracheostomy.    In response to her questions, I provided explanations of medical responses to laryngospasm  o She will discuss these with her PCP and her ophthalmologist.    As usual, she took copious notes during our discussion    IMPRESSIONS/GOALS/PLAN  Ms. Vasquez had a productive session of therapy today, working on techniques/strategies/exercises that will help her achieve her goal of reducing  R05 (Chronic Cough)   J38.3 (Paradoxial Vocal Fold Motion)   J38.7 (Irritable Larynx Syndrome  with a goal of eliminating her episodes of laryngospasm.   She will continue to work on her exercises on a daily basis, especially the new exercises, and work on incorporating the techniques into her daily activities.    Progress toward long-term goals: (Reporting period: 2/09/17  to 6/13/17)  Adequate progress; we will make objective  measures at her next session    Goals for this practice period:     practice all exercises according to instructions    maintain vigilance for breathing technique    Plan: I will see Ms. Vasquez in a month to work on education, modification, and carryover of therapeutic activities to more complex phonatory tasks.    G-Codes were reported on 2/09/17    PRIMARY ICD-10 code:  R05 (Chronic Cough)  SECONDARY ICD-10 code:  J38.3 (Paradoxial Vocal Fold Motion)   TERTIARY ICD-10 code:  J38.7 (Irritable Larynx Syndrome)    TOTAL SERVICE TIME: 60 minutes  TREATMENT (26673): 60 minutes  NO CHARGE FACILITY FEE (97127)    Shiela Vicente, Ph.D., [facility]-SLP  Speech-Language Pathologist  Director, [facility]  [phone]

## 2017-06-19 NOTE — PROGRESS NOTES
Outpatient Speech Language Therapy Evaluation  PLAN OF TREATMENT FOR OUTPATIENT REHABILITATION  Patient's Last Name, First Name, M.I.  YOB: 1934  Dorina Vasquez                        Provider's Name  Shiela Vicente Medical Record No.  [id number]                               Onset Date:  2/09/17   Start of Care Date: 2/09/17     Type: Speech Language Therapy Medical Diagnosis: No primary diagnosis found.                        Therapy Diagnosis:  Chronic Cough (R05).   Visits from SOC:  6   _________________________________________________________________________________  Plan of Treatment:   Speech therapy    DURATION/FREQUENCY OF TREATMENT  Four more monthly one-hour follow-up sessions    PROGNOSIS  Good prognosis for reduction in sympotms with speech therapy and regular practice of therapeutic activities.    BARRIERS TO LEARNING/TEACHING AND LEARNING NEEDS  None/Unremarkable    Goals:  Please see goals of 2/09/17, as we will continue these goals until discharge    I CERTIFY THE NEED FOR THESE SERVICES FURNISHED UNDER        THIS PLAN OF TREATMENT AND WHILE UNDER MY CARE     (Physician attestation of this document indicates review and certification of the therapy plan).     Certification date from: 5/11/17  Certification date to: 8/11/17    Referring Provider: Referred Self

## 2017-07-18 ENCOUNTER — OFFICE VISIT (OUTPATIENT)
Dept: OTOLARYNGOLOGY | Facility: CLINIC | Age: 82
End: 2017-07-18

## 2017-07-18 DIAGNOSIS — J38.7 IRRITABLE LARYNX SYNDROME: ICD-10-CM

## 2017-07-18 DIAGNOSIS — J38.3 VOCAL CORD DYSFUNCTION: ICD-10-CM

## 2017-07-18 DIAGNOSIS — R05.9 COUGH: Primary | ICD-10-CM

## 2017-07-18 NOTE — LETTER
7/18/2017      RE: Dorina Vasquez  [address] [facility] [address]       [facility]  Brice Beck Jr., M.D., F.A.C.S.  Deana Stanley M.D., M.P.H.  Shiela Vicente, Ph.D., CCC/SLP  Elizabeth Martinez M.M. (voice), M.A., CCC/SLP  John Souza M.M. (voice), M.A., CCC/SLP    [facility]  VOICE/SPEECH/BREATHING THERAPY PROGRESS REPORT    Patient: Dorina Vasquez  Date of Service: 7/18/2017  Date of Last Service: 6/13/17    I had the pleasure of seeing Ms. Vasquez today, for the 7th  therapy session (CPT code 86568).  She was referred by Dr. Bob Salazar of the [facility], for medically necessary speech therapy to address a diagnosis of:  R05 (Chronic Cough)   J38.3 (Vocal Cord Dysfunction) and   J38.7 (Irritable Larynx Syndrome)  Please refer to her initial evaluation report of 2/09/17 for complete details regarding diagnostic findings.    PROGRESS SINCE LAST SESSION  At the last session, Ms. Vasquez worked on therapeutic activities to reduce her cough and irritable larynx, thus reducing the chance of laryngospasm (vocal cord dysfunction); allowing  her to fully engage in activities of daily living.    Regarding practice, Ms. Vasquez reports the following:     Regular daily practice of deep abdominal breathing; she like this better than the short sniffs  o She thinks this is like a fire drill, to prevent laryngospasms    She hasn't done any of the voice exercises, but does concentrate on abdominal breaths while talking    Ms. Vasquez also states that:    She had a laryngospasm last week; it came on out of the blue, with no apparent trigger    Now, when she feels a tickle or cough, she takes a deep abdominal breath, in order to arrest the laryngospasm (which is always triggered by cough); then she takes a hard swallow and then sucks on a lozenge  o She has been able to arrest some episodes this way    If the cough comes on anyway, she does her old technique, including forced  exhalations    She will be having a CT scan soon, and is worried about choking on a lozenge or coughing    She plans to be discharged today, and wants to review all the concepts she learned in therapy.     Regarding her long-term goals:  1) she thinks her ILS symptoms are at a level of 3 out of 10, 80% of the time; goal met  2) she does not cough even every day, and when she coughs, it is for one or two seconds; goal met  3) she has been able to have week-long (or greater) periods of time with no laryngospasm; she is able to arrest them better; she feels confident in her ability to keep the vocal cord dysfunction at bay, at eventually eliminate it; goal met    Ms. Vasquez presents today with the following:  Voice quality:    Mild to moderate inconsistent strain    Good ability to improve, but largely unaware of this  Cough/ Throat clear:    Not observed  Breathing:    Comfortable, with good abdominal technique for inhalation    THERAPEUTIC ACTIVITIES  Today Ms. Vasquez participated in the following therapeutic activities:    Asked many questions about the nature of her symptoms and treatment, and I answered all of these thoroughly.  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; she can wear a mask if she wants, and also sipping on liquids, sucking on lozenges, and frequent saline nasal irrigation   o Continued practice of breathing exercises should continue to be helpful, and make her feel more confident about arresting, and eventually preventing, vocal cord dysfunction  o Continued practice of voice exercises is not necessary if she doesn't feel it helps her, but awareness of breathing while talking will  continue to be helpful  o We reviewed all the laryngeal configurations involved in an episode of laryngospasm and in practice of correct breathing exercises, with a laryngeal model and picture; she and her  found this review helpful and clarifying  o As always, she took copious notes    IMPRESSIONS/GOALS/PLAN  Ms. Vasquez had a productive session of therapy today, working on techniques/strategies/exercises that will help her achieve her goal of reducing  R05 (Chronic Cough)   J38.3 (Vocal Cord Dysfunction)  J38.7 (Irritable Larynx Syndrome)   allowing her to have normal breathing and fully engage in activities of daily living.   She will continue to work on her exercises on a daily basis, and work on incorporating the techniques into her daily activities.    Progress toward long-term goals: (Reporting period: 2/09/17  to 7/18/17)  Adequate progress; goals are met; please see above    Plan: Mr. Vasquez will continue to work on her own.    Reporting period 2/09/17 - 7/18/17  RATIONALE: Current level of functioning is:  CI - At least 1 percent but less than 20 percent impaired, limited, or restricted   based on Ms. Vasquez's extent of dysphonia, extent of impact on function, extent of discomfort, level of effort.    DISCHARGE SUMMARY  Dorina Vasquez has had an initial evaluation on 2/09/17 and had 7 subsequent therapy sessions.  A course of functional speech therapy was deemed medically necessary to meet the goal of reducing her cough, vocal cord dysfunction, and irritable larynx syndrome.  Ms. Vasquez has met her goals, and symptoms have steadily improved, in terms of functional outcome.  Therefore, she is discharged with the goals listed below.    GOALS  Long-term goal(s): In 6 months, Ms. Vasquez will:  1) report a week of typical activities with coughing and breathing problems that do not exceed a level of 3 out of 10, 80% of the time     G-Codes:   - Functional limitation, goal status, at discharge from therapy  C1 - At least 1 percent but less than 20 percent impaired, limited, or restricted   - Functional limitation, discharge status, at discharge from therapy CI - At least 1 percent but less than 20 percent impaired, limited, or restricted  G-Codes were reported on 2/09/17    PRIMARY ICD-10 code:  R05 (Chronic Cough)  SECONDARY ICD-10 code:  J38.3 (Vocal Cord Dysfunction)   TERTIARY ICD-10 code:  J38.7 (Irritable Larynx Syndrome)    TOTAL SERVICE TIME: 60 minutes  TREATMENT (52635): 60 minutes  NO CHARGE FACILITY FEE (96988)    Shiela Vicente, Ph.D., [facility]-SLP  Speech-Language Pathologist  Director, [facility]  [phone]

## 2017-07-18 NOTE — PROGRESS NOTES
[facility]  Brice Beck Jr., M.D., F.A.C.S.  Deana Stanley M.D., M.P.H.  Shiela Vicente, Ph.D., CCC/SLP  Elizabeth Martinez M.M. (voice), M.JESI., CCC/SLP  John Souza M.M. (voice), M.A., [facility]/SLP    [facility]  VOICE/SPEECH/BREATHING THERAPY PROGRESS REPORT    Patient: Dorina Vasquez  Date of Service: 7/18/2017  Date of Last Service: 6/13/17    I had the pleasure of seeing Ms. Vasquez today, for the 7th  therapy session (CPT code 28687).  She was referred by Dr. Bob Salazar of the [facility], for medically necessary speech therapy to address a diagnosis of:  R05 (Chronic Cough)   J38.3 (Vocal Cord Dysfunction) and   J38.7 (Irritable Larynx Syndrome)  Please refer to her initial evaluation report of 2/09/17 for complete details regarding diagnostic findings.    PROGRESS SINCE LAST SESSION  At the last session, Ms. Vasquez worked on therapeutic activities to reduce her cough and irritable larynx, thus reducing the chance of laryngospasm (vocal cord dysfunction); allowing  her to fully engage in activities of daily living.    Regarding practice, Ms. Vasquez reports the following:     Regular daily practice of deep abdominal breathing; she like this better than the short sniffs  o She thinks this is like a fire drill, to prevent laryngospasms    She hasn't done any of the voice exercises, but does concentrate on abdominal breaths while talking    Ms. Vasquez also states that:    She had a laryngospasm last week; it came on out of the blue, with no apparent trigger    Now, when she feels a tickle or cough, she takes a deep abdominal breath, in order to arrest the laryngospasm (which is always triggered by cough); then she takes a hard swallow and then sucks on a lozenge  o She has been able to arrest some episodes this way    If the cough comes on anyway, she does her old technique, including forced exhalations    She will be having a CT scan soon, and is worried about choking on a lozenge or  coughing    She plans to be discharged today, and wants to review all the concepts she learned in therapy.     Regarding her long-term goals:  1) she thinks her ILS symptoms are at a level of 3 out of 10, 80% of the time; goal met  2) she does not cough even every day, and when she coughs, it is for one or two seconds; goal met  3) she has been able to have week-long (or greater) periods of time with no laryngospasm; she is able to arrest them better; she feels confident in her ability to keep the vocal cord dysfunction at bay, at eventually eliminate it; goal met    Ms. Vasquez presents today with the following:  Voice quality:    Mild to moderate inconsistent strain    Good ability to improve, but largely unaware of this  Cough/ Throat clear:    Not observed  Breathing:    Comfortable, with good abdominal technique for inhalation    THERAPEUTIC ACTIVITIES  Today Ms. Vasquez participated in the following therapeutic activities:    Asked many questions about the nature of her symptoms and treatment, and I answered all of these thoroughly.  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; she can wear a mask if she wants, and also sipping on liquids, sucking on lozenges, and frequent saline nasal irrigation   o Continued practice of breathing exercises should continue to be helpful, and make her feel more confident about arresting, and eventually preventing, vocal cord dysfunction  o Continued practice of voice exercises is not necessary if she doesn't feel it helps her, but awareness of breathing while talking will continue to be helpful  o We reviewed all the laryngeal configurations involved in an episode  of laryngospasm and in practice of correct breathing exercises, with a laryngeal model and picture; she and her  found this review helpful and clarifying  o As always, she took copious notes    IMPRESSIONS/GOALS/PLAN  Ms. Vasquez had a productive session of therapy today, working on techniques/strategies/exercises that will help her achieve her goal of reducing  R05 (Chronic Cough)   J38.3 (Vocal Cord Dysfunction)  J38.7 (Irritable Larynx Syndrome)   allowing her to have normal breathing and fully engage in activities of daily living.   She will continue to work on her exercises on a daily basis, and work on incorporating the techniques into her daily activities.    Progress toward long-term goals: (Reporting period: 2/09/17  to 7/18/17)  Adequate progress; goals are met; please see above    Plan: Mr. Vasquez will continue to work on her own.    Reporting period 2/09/17 - 7/18/17  RATIONALE: Current level of functioning is:  CI - At least 1 percent but less than 20 percent impaired, limited, or restricted   based on Ms. Vasquez's extent of dysphonia, extent of impact on function, extent of discomfort, level of effort.    DISCHARGE SUMMARY  Dorina Vasquez has had an initial evaluation on 2/09/17 and had 7 subsequent therapy sessions.  A course of functional speech therapy was deemed medically necessary to meet the goal of reducing her cough, vocal cord dysfunction, and irritable larynx syndrome.  Ms. Vasquez has met her goals, and symptoms have steadily improved, in terms of functional outcome.  Therefore, she is discharged with the goals listed below.    GOALS  Long-term goal(s): In 6 months, Ms. Vasquez will:  1) report a week of typical activities with coughing and breathing problems that do not exceed a level of 3 out of 10, 80% of the time     G-Codes:   - Functional limitation, goal status, at discharge from therapy C1 - At least 1 percent but less than 20 percent impaired, limited, or restricted   -  Functional limitation, discharge status, at discharge from therapy CI - At least 1 percent but less than 20 percent impaired, limited, or restricted  G-Codes were reported on 2/09/17    PRIMARY ICD-10 code:  R05 (Chronic Cough)  SECONDARY ICD-10 code:  J38.3 (Vocal Cord Dysfunction)   TERTIARY ICD-10 code:  J38.7 (Irritable Larynx Syndrome)    TOTAL SERVICE TIME: 60 minutes  TREATMENT (40506): 60 minutes  NO CHARGE FACILITY FEE (08814)    Shiela Vicente, Ph.D., [facility]-SLP  Speech-Language Pathologist  Director, [facility]  [phone]

## 2017-07-18 NOTE — MR AVS SNAPSHOT
After Visit Summary   7/18/2017    Dorina Vasquez    MRN: [id number]           Patient Information     Date Of Birth          4/24/1934        Visit Information        Provider Department      7/18/2017 2:30 PM Shiela Vicente, SLP [facility]        Today's Diagnoses     Cough    -  1    Vocal cord dysfunction        Irritable larynx syndrome           Follow-ups after your visit        Who to contact     Please call your clinic at [phone] to:    Ask questions about your health    Make or cancel appointments    Discuss your medicines    Learn about your test results    Speak to your doctor   If you have compliments or concerns about an experience at your clinic, or if you wish to file a complaint, please contact [facility] Patient Relations at [phone] or email us at [email or web address]         Additional Information About Your Visit        MyChart Information     [employer] gives you secure access to your electronic health record. If you see a primary care provider, you can also send messages to your care team and make appointments. If you have questions, please call your primary care clinic.  If you do not have a primary care provider, please call [phone] and they will assist you.      [employer] is an electronic gateway that provides easy, online access to your medical records. With [employer], you can request a clinic appointment, read your test results, renew a prescription or communicate with your care team.     To access your existing account, please contact your [facility] Clinic or call [phone] for assistance.        Care EveryWhere ID     This is your Care EveryWhere ID. This could be used by other organizations to access your [place] medical records  [id number]         Blood Pressure from Last 3 Encounters:   No data found for BP    Weight from Last 3 Encounters:   No data found for Wt              We  Performed the Following      SLP VOICE D/C STATUS     HC SLP VOICE GOAL STATUS     SPEECH/HEARING THERAPY, INDIVIDUAL        Primary Care Provider Office Phone # Fax #    Jules Romero [phone]       [facility] [address] [address]        Equal Access to Services     [facility] : Hadii aad ku hadasho Soomaali, waaxda luqadaha, qaybta kaalmada adeegyada, waxshiloh daniel haycherryn franny reevessamsonya thurston. So [facility] [phone].    ATENCIÓN: Si habla español, tiene a perez disposición servicios gratuitos de asistencia lingüística. [place] al [phone].    We comply with applicable federal civil rights laws and [place] laws. We do not discriminate on the basis of race, color, national origin, age, disability sex, sexual orientation or gender identity.            Thank you!     Thank you for choosing [facility]  for your care. Our goal is always to provide you with excellent care. Hearing back from our patients is one way we can continue to improve our services. Please take a few minutes to complete the written survey that you may receive in the mail after your visit with us. Thank you!             Your Updated Medication List - Protect others around you: Learn how to safely use, store and throw away your medicines at www.disposemymeds.org.      Notice  As of 7/18/2017 11:59 PM    You have not been prescribed any medications.

## 2019-10-05 ENCOUNTER — HEALTH MAINTENANCE LETTER (OUTPATIENT)
Age: 84
End: 2019-10-05

## 2019-11-20 ENCOUNTER — APPOINTMENT (OUTPATIENT)
Age: 84
Setting detail: DERMATOLOGY
End: 2019-11-20

## 2019-11-20 DIAGNOSIS — L57.8 OTHER SKIN CHANGES DUE TO CHRONIC EXPOSURE TO NONIONIZING RADIATION: ICD-10-CM

## 2019-11-20 DIAGNOSIS — Z85.820 PERSONAL HISTORY OF MALIGNANT MELANOMA OF SKIN: ICD-10-CM

## 2019-11-20 DIAGNOSIS — L82.1 OTHER SEBORRHEIC KERATOSIS: ICD-10-CM

## 2019-11-20 DIAGNOSIS — L57.0 ACTINIC KERATOSIS: ICD-10-CM

## 2019-11-20 PROCEDURE — OTHER COUNSELING: OTHER

## 2019-11-20 PROCEDURE — 17000 DESTRUCT PREMALG LESION: CPT

## 2019-11-20 PROCEDURE — OTHER LIQUID NITROGEN: OTHER

## 2019-11-20 PROCEDURE — 99214 OFFICE O/P EST MOD 30 MIN: CPT | Mod: 25

## 2019-11-20 ASSESSMENT — LOCATION ZONE DERM
LOCATION ZONE: LEG
LOCATION ZONE: TRUNK
LOCATION ZONE: TRUNK

## 2019-11-20 ASSESSMENT — LOCATION DETAILED DESCRIPTION DERM
LOCATION DETAILED: RIGHT LATERAL BREAST 10-11:00 REGION
LOCATION DETAILED: RIGHT PROXIMAL PRETIBIAL REGION
LOCATION DETAILED: LEFT MEDIAL SUPERIOR CHEST
LOCATION DETAILED: STERNAL NOTCH

## 2019-11-20 ASSESSMENT — LOCATION SIMPLE DESCRIPTION DERM
LOCATION SIMPLE: RIGHT PRETIBIAL REGION
LOCATION SIMPLE: RIGHT BREAST
LOCATION SIMPLE: CHEST
LOCATION SIMPLE: CHEST

## 2019-11-20 NOTE — PROCEDURE: LIQUID NITROGEN
Number Of Freeze-Thaw Cycles: 1 freeze-thaw cycle
Detail Level: Zone
Render Post-Care Instructions In Note?: no
Consent: The patient's consent was obtained including but not limited to risks of crusting, scabbing, blistering, scarring, darker or lighter pigmentary change, recurrence, incomplete removal and infection.
Duration Of Freeze Thaw-Cycle (Seconds): 0
Post-Care Instructions: I reviewed with the patient in detail post-care instructions. Patient is to wear sunprotection, and avoid picking at any of the treated lesions. Pt may apply Vaseline to crusted or scabbing areas.

## 2019-12-03 ENCOUNTER — APPOINTMENT (OUTPATIENT)
Age: 84
Setting detail: DERMATOLOGY
End: 2019-12-03

## 2020-02-10 ENCOUNTER — HEALTH MAINTENANCE LETTER (OUTPATIENT)
Age: 85
End: 2020-02-10

## 2020-03-03 ENCOUNTER — APPOINTMENT (OUTPATIENT)
Age: 85
Setting detail: DERMATOLOGY
End: 2020-03-04

## 2020-03-03 DIAGNOSIS — R21 RASH AND OTHER NONSPECIFIC SKIN ERUPTION: ICD-10-CM

## 2020-03-03 DIAGNOSIS — L82.1 OTHER SEBORRHEIC KERATOSIS: ICD-10-CM

## 2020-03-03 PROCEDURE — OTHER PRESCRIPTION: OTHER

## 2020-03-03 PROCEDURE — 99213 OFFICE O/P EST LOW 20 MIN: CPT

## 2020-03-03 PROCEDURE — OTHER COUNSELING: OTHER

## 2020-03-03 RX ORDER — TRIAMCINOLONE ACETONIDE 1 MG/G
CREAM TOPICAL BID
Qty: 1 | Refills: 3 | Status: ERX | COMMUNITY
Start: 2020-03-03

## 2020-03-03 ASSESSMENT — LOCATION SIMPLE DESCRIPTION DERM
LOCATION SIMPLE: LEFT BREAST
LOCATION SIMPLE: CHEST
LOCATION SIMPLE: UPPER BACK

## 2020-03-03 ASSESSMENT — LOCATION ZONE DERM: LOCATION ZONE: TRUNK

## 2020-03-03 ASSESSMENT — PAIN INTENSITY VAS: HOW INTENSE IS YOUR PAIN 0 BEING NO PAIN, 10 BEING THE MOST SEVERE PAIN POSSIBLE?: NO PAIN

## 2020-03-03 ASSESSMENT — LOCATION DETAILED DESCRIPTION DERM
LOCATION DETAILED: LEFT LATERAL BREAST 3-4:00 REGION
LOCATION DETAILED: MIDDLE STERNUM
LOCATION DETAILED: SUPERIOR THORACIC SPINE

## 2020-03-03 ASSESSMENT — SEVERITY ASSESSMENT: SEVERITY: MODERATE

## 2020-03-03 NOTE — HPI: ITCHING
How Did Your Itching Occur?: sudden in onset (over a period of weeks to a few months)
How Severe Is Your Itching?: moderate
Additional History: Patient reports she started an antacid, famotadine, about 3 months ago. Patient denies any recent illness or sore throats.

## 2020-09-17 ENCOUNTER — APPOINTMENT (OUTPATIENT)
Dept: URBAN - METROPOLITAN AREA CLINIC 259 | Age: 85
Setting detail: DERMATOLOGY
End: 2020-09-17

## 2020-09-17 DIAGNOSIS — L20.89 OTHER ATOPIC DERMATITIS: ICD-10-CM

## 2020-09-17 DIAGNOSIS — L82.0 INFLAMED SEBORRHEIC KERATOSIS: ICD-10-CM

## 2020-09-17 DIAGNOSIS — B35.1 TINEA UNGUIUM: ICD-10-CM

## 2020-09-17 PROCEDURE — OTHER PRESCRIPTION: OTHER

## 2020-09-17 PROCEDURE — 99213 OFFICE O/P EST LOW 20 MIN: CPT | Mod: 95

## 2020-09-17 PROCEDURE — OTHER COUNSELING: OTHER

## 2020-09-17 RX ORDER — CLOBETASOL PROPIONATE 0.5 MG/G
OINTMENT TOPICAL
Qty: 1 | Refills: 2 | Status: ERX | COMMUNITY
Start: 2020-09-17

## 2020-09-17 RX ORDER — CICLOPIROX 80 MG/ML
SOLUTION TOPICAL
Qty: 1 | Refills: 3 | Status: ERX | COMMUNITY
Start: 2020-09-17

## 2020-09-17 ASSESSMENT — LOCATION DETAILED DESCRIPTION DERM
LOCATION DETAILED: RIGHT ANTERIOR PROXIMAL THIGH
LOCATION DETAILED: LEFT GREAT TOENAIL
LOCATION DETAILED: RIGHT GREAT TOENAIL
LOCATION DETAILED: MIDDLE STERNUM

## 2020-09-17 ASSESSMENT — LOCATION SIMPLE DESCRIPTION DERM
LOCATION SIMPLE: RIGHT GREAT TOE
LOCATION SIMPLE: LEFT GREAT TOE
LOCATION SIMPLE: CHEST
LOCATION SIMPLE: RIGHT THIGH

## 2020-09-17 ASSESSMENT — LOCATION ZONE DERM
LOCATION ZONE: TRUNK
LOCATION ZONE: TOENAIL
LOCATION ZONE: LEG

## 2020-09-17 NOTE — HPI: SKIN LESIONS
Have Your Skin Lesions Been Treated?: not been treated
Is This A New Presentation, Or A Follow-Up?: Skin Lesion
Additional History: Also has questions about toenails and follow up on rash. This patient’s service was provided via audio/visual telehealth communication technology and thus limited to the documented areas/problems based on this context.

## 2020-09-17 NOTE — PROCEDURE: COUNSELING
Detail Level: Zone
Patient Specific Counseling (Will Not Stick From Patient To Patient): Follow up in 2 weeks to reassess. Encouraged her to have full body skin exam since she is almost due for this (due in November 2020).
Patient Specific Counseling (Will Not Stick From Patient To Patient): Continue current therapy of moisturizing BID and using Triamcinolone BID PRN for flares.
WDL

## 2020-09-18 ENCOUNTER — RX ONLY (RX ONLY)
Age: 85
End: 2020-09-18

## 2020-09-18 RX ORDER — BETAMETHASONE DIPROPIONATE 0.5 MG/G
OINTMENT TOPICAL
Qty: 1 | Refills: 2 | Status: ERX | COMMUNITY
Start: 2020-09-18

## 2020-10-01 ENCOUNTER — APPOINTMENT (OUTPATIENT)
Dept: URBAN - METROPOLITAN AREA CLINIC 259 | Age: 85
Setting detail: DERMATOLOGY
End: 2020-10-01

## 2020-10-01 DIAGNOSIS — L20.89 OTHER ATOPIC DERMATITIS: ICD-10-CM

## 2020-10-01 DIAGNOSIS — L82.0 INFLAMED SEBORRHEIC KERATOSIS: ICD-10-CM

## 2020-10-01 DIAGNOSIS — L72.0 EPIDERMAL CYST: ICD-10-CM

## 2020-10-01 DIAGNOSIS — B35.1 TINEA UNGUIUM: ICD-10-CM

## 2020-10-01 PROCEDURE — 99214 OFFICE O/P EST MOD 30 MIN: CPT | Mod: 95

## 2020-10-01 PROCEDURE — OTHER COUNSELING: OTHER

## 2020-10-01 ASSESSMENT — LOCATION DETAILED DESCRIPTION DERM
LOCATION DETAILED: MIDDLE STERNUM
LOCATION DETAILED: RIGHT GREAT TOENAIL
LOCATION DETAILED: LEFT GREAT TOENAIL
LOCATION DETAILED: SUBMENTAL CHIN
LOCATION DETAILED: RIGHT ANTERIOR PROXIMAL THIGH

## 2020-10-01 ASSESSMENT — LOCATION SIMPLE DESCRIPTION DERM
LOCATION SIMPLE: LEFT GREAT TOE
LOCATION SIMPLE: SUBMENTAL CHIN
LOCATION SIMPLE: RIGHT THIGH
LOCATION SIMPLE: CHEST
LOCATION SIMPLE: RIGHT GREAT TOE

## 2020-10-01 ASSESSMENT — LOCATION ZONE DERM
LOCATION ZONE: TOENAIL
LOCATION ZONE: TRUNK
LOCATION ZONE: LEG
LOCATION ZONE: FACE

## 2020-10-01 NOTE — PROCEDURE: COUNSELING
Patient Specific Counseling (Will Not Stick From Patient To Patient): Difficulty visualizing on video today, but per patient’s description, likely an epidermal cyst. Advised patient to follow up if this grows, bleeds or becomes painful. Patient expressed understanding. Patient due for full body skin exam in November 2020.
Detail Level: Zone
Patient Specific Counseling (Will Not Stick From Patient To Patient): Stop Clobetasol, start Vaseline/Aquaphor BID PRN.
Patient Specific Counseling (Will Not Stick From Patient To Patient): Continue current therapy of moisturizing BID and using Triamcinolone BID PRN for flares.
Patient Specific Counseling (Will Not Stick From Patient To Patient): Patient will try resuming Penlac and if not tolerated, will switch to OTC Vicks Vaporub. Follow up in 6 months PRN.
Detail Level: Detailed

## 2020-11-09 ENCOUNTER — APPOINTMENT (OUTPATIENT)
Dept: URBAN - METROPOLITAN AREA CLINIC 259 | Age: 85
Setting detail: DERMATOLOGY
End: 2020-11-09

## 2020-11-09 DIAGNOSIS — L82.1 OTHER SEBORRHEIC KERATOSIS: ICD-10-CM

## 2020-11-09 DIAGNOSIS — L57.8 OTHER SKIN CHANGES DUE TO CHRONIC EXPOSURE TO NONIONIZING RADIATION: ICD-10-CM

## 2020-11-09 DIAGNOSIS — B35.1 TINEA UNGUIUM: ICD-10-CM

## 2020-11-09 DIAGNOSIS — S31000A OPEN WOUND(S) (MULTIPLE) OF UNSPECIFIED SITE(S), WITHOUT MENTION OF COMPLICATION: ICD-10-CM

## 2020-11-09 DIAGNOSIS — L81.4 OTHER MELANIN HYPERPIGMENTATION: ICD-10-CM

## 2020-11-09 DIAGNOSIS — D22 MELANOCYTIC NEVI: ICD-10-CM

## 2020-11-09 DIAGNOSIS — Z85.820 PERSONAL HISTORY OF MALIGNANT MELANOMA OF SKIN: ICD-10-CM

## 2020-11-09 DIAGNOSIS — Z71.89 OTHER SPECIFIED COUNSELING: ICD-10-CM

## 2020-11-09 DIAGNOSIS — D18.0 HEMANGIOMA: ICD-10-CM

## 2020-11-09 PROBLEM — D22.5 MELANOCYTIC NEVI OF TRUNK: Status: ACTIVE | Noted: 2020-11-09

## 2020-11-09 PROBLEM — D18.01 HEMANGIOMA OF SKIN AND SUBCUTANEOUS TISSUE: Status: ACTIVE | Noted: 2020-11-09

## 2020-11-09 PROBLEM — S91.302A UNSPECIFIED OPEN WOUND, LEFT FOOT, INITIAL ENCOUNTER: Status: ACTIVE | Noted: 2020-11-09

## 2020-11-09 PROCEDURE — OTHER COUNSELING: OTHER

## 2020-11-09 PROCEDURE — 99214 OFFICE O/P EST MOD 30 MIN: CPT

## 2020-11-09 ASSESSMENT — LOCATION DETAILED DESCRIPTION DERM
LOCATION DETAILED: LEFT INFERIOR MEDIAL UPPER BACK
LOCATION DETAILED: LEFT MEDIAL UPPER BACK
LOCATION DETAILED: LEFT MEDIAL PLANTAR HEEL
LOCATION DETAILED: LEFT INGUINAL CREASE
LOCATION DETAILED: LEFT GREAT TOENAIL
LOCATION DETAILED: RIGHT GREAT TOENAIL
LOCATION DETAILED: RIGHT SUPERIOR MEDIAL UPPER BACK
LOCATION DETAILED: INFERIOR THORACIC SPINE
LOCATION DETAILED: RIGHT 2ND TOENAIL
LOCATION DETAILED: LEFT 2ND TOENAIL
LOCATION DETAILED: LEFT MEDIAL PLANTAR HEEL

## 2020-11-09 ASSESSMENT — LOCATION SIMPLE DESCRIPTION DERM
LOCATION SIMPLE: RIGHT 2ND TOE
LOCATION SIMPLE: LEFT GREAT TOE
LOCATION SIMPLE: LEFT PLANTAR SURFACE
LOCATION SIMPLE: RIGHT GREAT TOE
LOCATION SIMPLE: UPPER BACK
LOCATION SIMPLE: GROIN
LOCATION SIMPLE: LEFT UPPER BACK
LOCATION SIMPLE: LEFT PLANTAR SURFACE
LOCATION SIMPLE: RIGHT UPPER BACK
LOCATION SIMPLE: LEFT 2ND TOE

## 2020-11-09 ASSESSMENT — LOCATION ZONE DERM
LOCATION ZONE: TRUNK
LOCATION ZONE: TOENAIL
LOCATION ZONE: FEET
LOCATION ZONE: FEET

## 2020-11-09 NOTE — PROCEDURE: COUNSELING
Detail Level: Detailed
Detail Level: Generalized
Patient Specific Counseling (Will Not Stick From Patient To Patient): Patient advised to monitor for worsening or changes, no treatment recommended at this time.
Patient Specific Counseling (Will Not Stick From Patient To Patient): \\nPatient advised to continue applying Mupirocin ointment BID until healed.
Detail Level: Zone

## 2020-11-14 ENCOUNTER — HEALTH MAINTENANCE LETTER (OUTPATIENT)
Age: 85
End: 2020-11-14

## 2021-01-01 ENCOUNTER — HEALTH MAINTENANCE LETTER (OUTPATIENT)
Age: 86
End: 2021-01-01

## 2021-04-03 ENCOUNTER — HEALTH MAINTENANCE LETTER (OUTPATIENT)
Age: 86
End: 2021-04-03

## 2021-04-29 ENCOUNTER — APPOINTMENT (OUTPATIENT)
Dept: URBAN - METROPOLITAN AREA CLINIC 254 | Age: 86
Setting detail: DERMATOLOGY
End: 2021-04-30

## 2021-04-29 VITALS — HEIGHT: 62 IN | RESPIRATION RATE: 14 BRPM | WEIGHT: 123 LBS

## 2021-04-29 DIAGNOSIS — L20.89 OTHER ATOPIC DERMATITIS: ICD-10-CM

## 2021-04-29 DIAGNOSIS — R20.2 PARESTHESIA OF SKIN: ICD-10-CM

## 2021-04-29 DIAGNOSIS — L40.0 PSORIASIS VULGARIS: ICD-10-CM

## 2021-04-29 PROBLEM — L30.9 DERMATITIS, UNSPECIFIED: Status: ACTIVE | Noted: 2021-04-29

## 2021-04-29 PROCEDURE — OTHER MIPS QUALITY: OTHER

## 2021-04-29 PROCEDURE — OTHER PRESCRIPTION MEDICATION MANAGEMENT: OTHER

## 2021-04-29 PROCEDURE — 99214 OFFICE O/P EST MOD 30 MIN: CPT

## 2021-04-29 PROCEDURE — OTHER ADDITIONAL NOTES: OTHER

## 2021-04-29 PROCEDURE — OTHER COUNSELING: OTHER

## 2021-04-29 PROCEDURE — OTHER PRESCRIPTION: OTHER

## 2021-04-29 RX ORDER — TRIAMCINOLONE ACETONIDE 1 MG/G
0.1% CREAM TOPICAL
Qty: 1 | Refills: 0 | Status: ERX

## 2021-04-29 ASSESSMENT — LOCATION SIMPLE DESCRIPTION DERM
LOCATION SIMPLE: UPPER BACK
LOCATION SIMPLE: RIGHT THIGH
LOCATION SIMPLE: RIGHT AXILLARY VAULT
LOCATION SIMPLE: CHEST
LOCATION SIMPLE: LEFT AXILLARY VAULT

## 2021-04-29 ASSESSMENT — LOCATION DETAILED DESCRIPTION DERM
LOCATION DETAILED: RIGHT AXILLARY VAULT
LOCATION DETAILED: SUPERIOR THORACIC SPINE
LOCATION DETAILED: LEFT AXILLARY VAULT
LOCATION DETAILED: UPPER STERNUM
LOCATION DETAILED: RIGHT ANTERIOR PROXIMAL THIGH

## 2021-04-29 ASSESSMENT — LOCATION ZONE DERM
LOCATION ZONE: AXILLAE
LOCATION ZONE: LEG
LOCATION ZONE: TRUNK

## 2021-04-29 NOTE — PROCEDURE: PRESCRIPTION MEDICATION MANAGEMENT
Detail Level: Detailed
Samples Given: -Apply Sarna or Eucerin Itch relief lotion as often as desired to your back and chest. Use CeraVe gentle cleanser to wash your skin when you take a shower or bath.
Render In Strict Bullet Format?: No
Initiate Treatment: -Apply triamcinolone to rash on chest and back twice per day for 2 weeks, followed by a 2 week break. Repeat as needed.
Initiate Treatment: -Use Clobetasol ointment for the itchy area on right thigh. Apply 3 times per day for 2 weeks, then take a break for 2 weeks. Repeat cycle as needed.

## 2021-04-29 NOTE — PROCEDURE: ADDITIONAL NOTES
Detail Level: Detailed
Render Risk Assessment In Note?: no
Additional Notes: -Try one of your steroid creams first. Patient declines biopsy.

## 2021-05-12 ENCOUNTER — APPOINTMENT (OUTPATIENT)
Dept: URBAN - METROPOLITAN AREA CLINIC 254 | Age: 86
Setting detail: DERMATOLOGY
End: 2021-05-13

## 2021-05-12 VITALS — HEIGHT: 62 IN | WEIGHT: 122 LBS | RESPIRATION RATE: 14 BRPM

## 2021-05-12 DIAGNOSIS — L82.0 INFLAMED SEBORRHEIC KERATOSIS: ICD-10-CM

## 2021-05-12 DIAGNOSIS — L20.89 OTHER ATOPIC DERMATITIS: ICD-10-CM

## 2021-05-12 DIAGNOSIS — D69.2 OTHER NONTHROMBOCYTOPENIC PURPURA: ICD-10-CM

## 2021-05-12 DIAGNOSIS — L92.3 FOREIGN BODY GRANULOMA OF THE SKIN AND SUBCUTANEOUS TISSUE: ICD-10-CM

## 2021-05-12 DIAGNOSIS — Z71.89 OTHER SPECIFIED COUNSELING: ICD-10-CM

## 2021-05-12 DIAGNOSIS — L82.1 OTHER SEBORRHEIC KERATOSIS: ICD-10-CM

## 2021-05-12 PROBLEM — D48.5 NEOPLASM OF UNCERTAIN BEHAVIOR OF SKIN: Status: ACTIVE | Noted: 2021-05-12

## 2021-05-12 PROCEDURE — OTHER PRESCRIPTION MEDICATION MANAGEMENT: OTHER

## 2021-05-12 PROCEDURE — 17110 DESTRUCT B9 LESION 1-14: CPT

## 2021-05-12 PROCEDURE — OTHER DEFER: OTHER

## 2021-05-12 PROCEDURE — OTHER LIQUID NITROGEN: OTHER

## 2021-05-12 PROCEDURE — 99213 OFFICE O/P EST LOW 20 MIN: CPT | Mod: 25

## 2021-05-12 PROCEDURE — OTHER COUNSELING: OTHER

## 2021-05-12 PROCEDURE — OTHER ADDITIONAL NOTES: OTHER

## 2021-05-12 ASSESSMENT — LOCATION DETAILED DESCRIPTION DERM
LOCATION DETAILED: INFERIOR THORACIC SPINE
LOCATION DETAILED: SUPERIOR LUMBAR SPINE
LOCATION DETAILED: LEFT ANTERIOR PROXIMAL UPPER ARM
LOCATION DETAILED: RIGHT PROXIMAL RADIAL DORSAL FOREARM
LOCATION DETAILED: LEFT PROXIMAL DORSAL FOREARM

## 2021-05-12 ASSESSMENT — LOCATION SIMPLE DESCRIPTION DERM
LOCATION SIMPLE: LOWER BACK
LOCATION SIMPLE: LEFT UPPER ARM
LOCATION SIMPLE: RIGHT FOREARM
LOCATION SIMPLE: LEFT FOREARM
LOCATION SIMPLE: UPPER BACK

## 2021-05-12 ASSESSMENT — LOCATION ZONE DERM
LOCATION ZONE: ARM
LOCATION ZONE: TRUNK

## 2021-05-12 NOTE — PROCEDURE: COUNSELING
Detail Level: Detailed
Moisturizer Recommendations: Apply Sarna or Eucerin Itch relief lotion as often as desired to your back and chest. Use CeraVe gentle cleanser to wash your skin when you take a shower or bath.
Patient Specific Counseling (Will Not Stick From Patient To Patient): Discussed importance of sun protection to avoid developing skin cancers or precancers.
Detail Level: Zone
Patient Specific Counseling (Will Not Stick From Patient To Patient): - Advised that this lesion has features that suggest a skin cancer so a biopsy is necessary to confirm the diagnosis and guide treatment.\\n- Patient expressed understanding. \\n- If skin cancer is confirmed, further treatment will be necessary.

## 2021-05-12 NOTE — PROCEDURE: ADDITIONAL NOTES
Detail Level: Simple
Additional Notes: Blue suture noted today during exam. Pt deferred removal and is to follow up with oncologist.
Render Risk Assessment In Note?: no

## 2021-05-12 NOTE — PROCEDURE: PRESCRIPTION MEDICATION MANAGEMENT
Detail Level: Detailed
Render In Strict Bullet Format?: No
Continue Regimen: Apply triamcinolone to rash on chest and back twice per day for 2 weeks, followed by a 2 week break. Repeat as needed.

## 2021-05-12 NOTE — PROCEDURE: LIQUID NITROGEN
Initial RN admission and assessment performed and documented in Endoscopy navigator. Patient evaluated by anesthesia in pre-procedure holding. All procedural vital signs, airway assessment, and level of consciousness information monitored and recorded by anesthesia staff on the anesthesia record. Report received from CRNA post procedure. Patient transported to recovery area by RN.     Endoscope was pre-cleaned at bedside immediately following procedure by Yani Pa
Detail Level: Detailed
Medical Necessity Clause: This procedure was medically necessary because the lesions that were treated were:
Medical Necessity Information: It is in your best interest to select a reason for this procedure from the list below. All of these items fulfill various CMS LCD requirements except the new and changing color options.
Render Post-Care Instructions In Note?: yes
Post-Care Instructions: - Avoid picking at any of the treated lesions.
Consent: - Verbal and written consent was obtained, and risks were reviewed prior to procedure today. \\n- Risks discussed include but are not limited to pain, crusting, scabbing, blistering, scarring, temporary or permanent darker or lighter pigmentary change, recurrence, incomplete resolution, and infection.
Include Z78.9 (Other Specified Conditions Influencing Health Status) As An Associated Diagnosis?: No

## 2021-05-12 NOTE — PROCEDURE: DEFER
Detail Level: Detailed
Introduction Text (Please End With A Colon): The following procedure was deferred:
Instructions (Optional): Pt would like to bx at [facility]

## 2021-05-19 ENCOUNTER — APPOINTMENT (OUTPATIENT)
Dept: URBAN - METROPOLITAN AREA CLINIC 254 | Age: 86
Setting detail: DERMATOLOGY
End: 2021-05-22

## 2021-05-19 VITALS — RESPIRATION RATE: 15 BRPM | HEIGHT: 62 IN | WEIGHT: 122 LBS

## 2021-05-19 DIAGNOSIS — L29.8 OTHER PRURITUS: ICD-10-CM

## 2021-05-19 DIAGNOSIS — L82.1 OTHER SEBORRHEIC KERATOSIS: ICD-10-CM

## 2021-05-19 DIAGNOSIS — L82.0 INFLAMED SEBORRHEIC KERATOSIS: ICD-10-CM

## 2021-05-19 DIAGNOSIS — R20.9 UNSPECIFIED DISTURBANCES OF SKIN SENSATION: ICD-10-CM

## 2021-05-19 PROBLEM — D48.5 NEOPLASM OF UNCERTAIN BEHAVIOR OF SKIN: Status: ACTIVE | Noted: 2021-05-19

## 2021-05-19 PROCEDURE — OTHER DEFER: OTHER

## 2021-05-19 PROCEDURE — OTHER ADDITIONAL NOTES: OTHER

## 2021-05-19 PROCEDURE — OTHER COUNSELING: OTHER

## 2021-05-19 PROCEDURE — 99213 OFFICE O/P EST LOW 20 MIN: CPT | Mod: 24

## 2021-05-19 ASSESSMENT — LOCATION DETAILED DESCRIPTION DERM
LOCATION DETAILED: SUPERIOR LUMBAR SPINE
LOCATION DETAILED: RIGHT MEDIAL UPPER BACK
LOCATION DETAILED: RIGHT PROXIMAL DORSAL FOREARM
LOCATION DETAILED: LEFT PROXIMAL DORSAL FOREARM

## 2021-05-19 ASSESSMENT — LOCATION SIMPLE DESCRIPTION DERM
LOCATION SIMPLE: RIGHT FOREARM
LOCATION SIMPLE: LEFT FOREARM
LOCATION SIMPLE: LOWER BACK
LOCATION SIMPLE: RIGHT UPPER BACK

## 2021-05-19 ASSESSMENT — LOCATION ZONE DERM
LOCATION ZONE: ARM
LOCATION ZONE: TRUNK

## 2021-05-19 NOTE — PROCEDURE: COUNSELING
Patient Specific Counseling (Will Not Stick From Patient To Patient): -Patient declined further treatment.
Patient Specific Counseling (Will Not Stick From Patient To Patient): -Use Eucerin lotion. Help the residual SK by gently wiping with a cloth.
Detail Level: Detailed
Detail Level: Zone
Cleanser Recommendations: Recommend gentle cleanser, Eucerin eczema relief, samples given today.
Patient Specific Counseling (Will Not Stick From Patient To Patient): - Advised that this lesion has features that suggest a skin cancer so a biopsy is necessary to confirm the diagnosis and guide treatment.\\n- Patient expressed understanding. \\n- If skin cancer is confirmed, further treatment will be necessary.

## 2021-05-19 NOTE — PROCEDURE: ADDITIONAL NOTES
Detail Level: Detailed
Render Risk Assessment In Note?: no
Additional Notes: -Patient expressed having shooting pain around the area of her Melanoma wound site, which is also near the recently treated SK.\\n-Advised this is most likely from nerve changes d/t cryo and excision.\\n-If ANY blisters start, recommend treatment for SHINGLES

## 2021-05-19 NOTE — PROCEDURE: DEFER
Instructions (Optional): Pt would like to bx at NOV
Detail Level: Detailed
Introduction Text (Please End With A Colon): The following procedure was deferred:

## 2021-08-19 ENCOUNTER — APPOINTMENT (OUTPATIENT)
Dept: URBAN - METROPOLITAN AREA CLINIC 254 | Age: 86
Setting detail: DERMATOLOGY
End: 2021-08-22

## 2021-08-19 VITALS — HEIGHT: 72 IN | WEIGHT: 61 LBS

## 2021-08-19 VITALS — WEIGHT: 61 LBS | HEIGHT: 72 IN | RESPIRATION RATE: 14 BRPM

## 2021-08-19 DIAGNOSIS — L85.3 XEROSIS CUTIS: ICD-10-CM

## 2021-08-19 DIAGNOSIS — F42.4 EXCORIATION (SKIN-PICKING) DISORDER: ICD-10-CM

## 2021-08-19 DIAGNOSIS — L57.0 ACTINIC KERATOSIS: ICD-10-CM

## 2021-08-19 DIAGNOSIS — D22 MELANOCYTIC NEVI: ICD-10-CM

## 2021-08-19 DIAGNOSIS — L82.1 OTHER SEBORRHEIC KERATOSIS: ICD-10-CM

## 2021-08-19 DIAGNOSIS — Z71.89 OTHER SPECIFIED COUNSELING: ICD-10-CM

## 2021-08-19 PROBLEM — D22.62 MELANOCYTIC NEVI OF LEFT UPPER LIMB, INCLUDING SHOULDER: Status: ACTIVE | Noted: 2021-08-19

## 2021-08-19 PROBLEM — S80.911A UNSPECIFIED SUPERFICIAL INJURY OF RIGHT KNEE, INITIAL ENCOUNTER: Status: ACTIVE | Noted: 2021-08-19

## 2021-08-19 PROCEDURE — OTHER COUNSELING: OTHER

## 2021-08-19 PROCEDURE — OTHER ADDITIONAL NOTES: OTHER

## 2021-08-19 PROCEDURE — 99213 OFFICE O/P EST LOW 20 MIN: CPT

## 2021-08-19 ASSESSMENT — LOCATION SIMPLE DESCRIPTION DERM
LOCATION SIMPLE: CHEST
LOCATION SIMPLE: RIGHT POSTERIOR UPPER ARM
LOCATION SIMPLE: LEFT FOREARM
LOCATION SIMPLE: RIGHT LIP
LOCATION SIMPLE: LEFT POSTERIOR UPPER ARM
LOCATION SIMPLE: NOSE
LOCATION SIMPLE: RIGHT KNEE
LOCATION SIMPLE: LOWER BACK

## 2021-08-19 ASSESSMENT — LOCATION DETAILED DESCRIPTION DERM
LOCATION DETAILED: RIGHT UPPER CUTANEOUS LIP
LOCATION DETAILED: NASAL SUPRATIP
LOCATION DETAILED: UPPER STERNUM
LOCATION DETAILED: LEFT PROXIMAL DORSAL FOREARM
LOCATION DETAILED: RIGHT DISTAL POSTERIOR UPPER ARM
LOCATION DETAILED: SUPERIOR LUMBAR SPINE
LOCATION DETAILED: NASAL DORSUM
LOCATION DETAILED: LEFT DISTAL POSTERIOR UPPER ARM
LOCATION DETAILED: RIGHT KNEE

## 2021-08-19 ASSESSMENT — LOCATION ZONE DERM
LOCATION ZONE: ARM
LOCATION ZONE: LEG
LOCATION ZONE: NOSE
LOCATION ZONE: LIP
LOCATION ZONE: TRUNK

## 2021-08-19 NOTE — PROCEDURE: COUNSELING
Patient Specific Counseling (Will Not Stick From Patient To Patient): Discussed importance of sun protection to avoid developing skin cancers or precancers.
Detail Level: Zone
Detail Level: Detailed
Patient Specific Counseling (Will Not Stick From Patient To Patient): Recommend AmLactin or LacHydrin lotion PRN for roughness.

## 2021-08-19 NOTE — PROCEDURE: ADDITIONAL NOTES
Additional Notes: Recommended a topical numbing cream to relieve occasional stinging sensation.
Additional Notes: Pt refused treatment to lesions on face.
Detail Level: Simple
Render Risk Assessment In Note?: no

## 2021-10-21 ENCOUNTER — APPOINTMENT (OUTPATIENT)
Dept: URBAN - METROPOLITAN AREA CLINIC 254 | Age: 86
Setting detail: DERMATOLOGY
End: 2021-10-26

## 2021-10-21 DIAGNOSIS — L82.0 INFLAMED SEBORRHEIC KERATOSIS: ICD-10-CM

## 2021-10-21 DIAGNOSIS — L29.8 OTHER PRURITUS: ICD-10-CM

## 2021-10-21 PROCEDURE — OTHER PRESCRIPTION: OTHER

## 2021-10-21 PROCEDURE — 17110 DESTRUCT B9 LESION 1-14: CPT

## 2021-10-21 PROCEDURE — OTHER ADDITIONAL NOTES: OTHER

## 2021-10-21 PROCEDURE — OTHER LIQUID NITROGEN: OTHER

## 2021-10-21 PROCEDURE — 99213 OFFICE O/P EST LOW 20 MIN: CPT | Mod: 25

## 2021-10-21 PROCEDURE — OTHER COUNSELING: OTHER

## 2021-10-21 ASSESSMENT — LOCATION DETAILED DESCRIPTION DERM
LOCATION DETAILED: RIGHT VENTRAL PROXIMAL FOREARM
LOCATION DETAILED: RIGHT ANTERIOR DISTAL UPPER ARM
LOCATION DETAILED: LEFT ANTERIOR DISTAL UPPER ARM
LOCATION DETAILED: LEFT LATERAL BREAST 4-5:00 REGION
LOCATION DETAILED: LEFT VENTRAL PROXIMAL FOREARM
LOCATION DETAILED: LEFT VENTRAL LATERAL PROXIMAL FOREARM

## 2021-10-21 ASSESSMENT — LOCATION SIMPLE DESCRIPTION DERM
LOCATION SIMPLE: LEFT BREAST
LOCATION SIMPLE: RIGHT UPPER ARM
LOCATION SIMPLE: RIGHT FOREARM
LOCATION SIMPLE: LEFT FOREARM
LOCATION SIMPLE: LEFT UPPER ARM

## 2021-10-21 ASSESSMENT — LOCATION ZONE DERM
LOCATION ZONE: ARM
LOCATION ZONE: TRUNK

## 2021-10-21 NOTE — PROCEDURE: ADDITIONAL NOTES
Additional Notes: Patient denied treatment of ISKs with liquid nitrogen on her arms and chest. Patient advised she can use previously prescribed triamcinolone to help with the pruritus. Patient advised to use triamcinolone BID for 2 weeks then 2 weeks of Eucrien moisturizer then repeat as necessary.

## 2021-10-21 NOTE — PROCEDURE: ADDITIONAL NOTES
Additional Notes: patient would like to start on [facility] of almtriptlene and ketamine compounded cream. Advised patient to try triamcinolone on the less stubborn lesions and save the almtriptlene on the more stubborn itchy lesions. Patient received written handout on how to use both prescriptions. Almtriptlene/ketamine up to 4 times a day as needed for pruritus and triamcinolone BID for no longer than 14 days a month. Additional Notes: patient would like to start on [place] [facility] of almtriptlene and ketamine compounded cream. Advised patient to try triamcinolone on the less stubborn lesions and save the almtriptlene on the more stubborn itchy lesions. Patient received written handout on how to use both prescriptions. Almtriptlene/ketamine up to 4 times a day as needed for pruritus and triamcinolone BID for no longer than 14 days a month.

## 2021-10-21 NOTE — PROCEDURE: LIQUID NITROGEN
Include Z78.9 (Other Specified Conditions Influencing Health Status) As An Associated Diagnosis?: No
Render Note In Bullet Format When Appropriate: Yes
Medical Necessity Clause: This procedure was medically necessary because the lesions that were treated were:
Consent: - Verbal and written consent was obtained, and risks were reviewed prior to procedure today. \\n- Risks discussed include but are not limited to pain, crusting, scabbing, blistering, scarring, temporary or permanent darker or lighter pigmentary change, recurrence, incomplete resolution, and infection.
Detail Level: Detailed
Medical Necessity Information: It is in your best interest to select a reason for this procedure from the list below. All of these items fulfill various CMS LCD requirements except the new and changing color options.
Post-Care Instructions: - Avoid picking at any of the treated lesions.

## 2021-10-21 NOTE — HPI: SECONDARY COMPLAINT
How Severe Is This Condition?: mild
Additional History: Patient reports she uses Eucerin and triamcinolone on her chest and arm. Patient reports pruritus on chest and arms but has not been able to apply ointment due to shoulder recovery.

## 2021-10-21 NOTE — HPI: SKIN LESION
What Type Of Note Output Would You Prefer (Optional)?: Bullet Format
How Severe Is Your Skin Lesion?: mild
Has Your Skin Lesion Been Treated?: not been treated
Is This A New Presentation, Or A Follow-Up?: Skin Lesion
Additional History: Patient reports lesion may have been bleeding in the shower, patient reports it was hard to tell if it was blood or just pink. Patient presents for further evaluation and treatment options of lesion.

## 2022-01-01 ENCOUNTER — HEALTH MAINTENANCE LETTER (OUTPATIENT)
Age: 87
End: 2022-01-01

## 2022-03-03 ENCOUNTER — APPOINTMENT (OUTPATIENT)
Dept: URBAN - METROPOLITAN AREA CLINIC 254 | Age: 87
Setting detail: DERMATOLOGY
End: 2022-03-04

## 2022-03-03 DIAGNOSIS — L82.1 OTHER SEBORRHEIC KERATOSIS: ICD-10-CM

## 2022-03-03 DIAGNOSIS — L29.8 OTHER PRURITUS: ICD-10-CM

## 2022-03-03 DIAGNOSIS — C79.89 SECONDARY MALIGNANT NEOPLASM OF OTHER SPECIFIED SITES: ICD-10-CM

## 2022-03-03 PROCEDURE — 99214 OFFICE O/P EST MOD 30 MIN: CPT

## 2022-03-03 PROCEDURE — OTHER COUNSELING: OTHER

## 2022-03-03 ASSESSMENT — LOCATION SIMPLE DESCRIPTION DERM
LOCATION SIMPLE: RIGHT UPPER ARM
LOCATION SIMPLE: RIGHT UPPER BACK
LOCATION SIMPLE: LEFT FOREARM
LOCATION SIMPLE: RIGHT FOREARM
LOCATION SIMPLE: LEFT TEMPLE
LOCATION SIMPLE: LEFT UPPER BACK
LOCATION SIMPLE: LEFT UPPER BACK
LOCATION SIMPLE: RIGHT UPPER BACK
LOCATION SIMPLE: LOWER BACK
LOCATION SIMPLE: RIGHT BREAST
LOCATION SIMPLE: RIGHT BREAST
LOCATION SIMPLE: UPPER BACK
LOCATION SIMPLE: RIGHT TEMPLE

## 2022-03-03 ASSESSMENT — LOCATION DETAILED DESCRIPTION DERM
LOCATION DETAILED: RIGHT INFRAMAMMARY CREASE (OUTER QUADRANT)
LOCATION DETAILED: LEFT VENTRAL LATERAL PROXIMAL FOREARM
LOCATION DETAILED: SUPERIOR THORACIC SPINE
LOCATION DETAILED: RIGHT MID TEMPLE
LOCATION DETAILED: RIGHT MEDIAL UPPER BACK
LOCATION DETAILED: RIGHT INFRAMAMMARY CREASE (OUTER QUADRANT)
LOCATION DETAILED: LEFT INFERIOR UPPER BACK
LOCATION DETAILED: RIGHT ANTERIOR MEDIAL PROXIMAL UPPER ARM
LOCATION DETAILED: LEFT MID TEMPLE
LOCATION DETAILED: RIGHT INFERIOR TEMPLE
LOCATION DETAILED: RIGHT MEDIAL UPPER BACK
LOCATION DETAILED: LEFT INFERIOR UPPER BACK
LOCATION DETAILED: RIGHT VENTRAL PROXIMAL FOREARM
LOCATION DETAILED: SUPERIOR LUMBAR SPINE

## 2022-03-03 ASSESSMENT — LOCATION ZONE DERM
LOCATION ZONE: FACE
LOCATION ZONE: TRUNK
LOCATION ZONE: TRUNK
LOCATION ZONE: ARM

## 2022-03-03 NOTE — HPI: MELANOMA F/U (HISTORY OF MALIGNANT MELANOMA)
What Is The Reason For Today's Visit?: Melanoma
What Stage Is The Melanoma?: Stage IV
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 She underwent a colectomy this November for removal of the melanoma however she has continued to develop new nodules throughout her body specifically on her back right axilla and left lower back. She has discussed with her oncologist The options of undergoing further treatment versus palliative care she comes in today to discuss additional skin lesions and for my medical opinion.

## 2022-03-03 NOTE — PROCEDURE: COUNSELING
Detail Level: Zone
Detail Level: Detailed
Patient Specific Counseling (Will Not Stick From Patient To Patient): - patient reports improvement with [facility] (amitriptyline-ketamine compounded topical) \\n- advised patient if she is feeling pain vs itching then the oncologist likely has better medication options for pain as she enters more palliative care for her metastatic melanoma.\\n- will send refills
Detail Level: Simple
Show Follow-Up Variable: Yes